# Patient Record
Sex: MALE | Race: WHITE
[De-identification: names, ages, dates, MRNs, and addresses within clinical notes are randomized per-mention and may not be internally consistent; named-entity substitution may affect disease eponyms.]

---

## 2017-01-04 LAB
BUN SERPL-MCNC: 14 MG/DL (ref 7–18)
BUN/CREAT SERPL: 15.1 (ref 10–20)
CREAT SERPL-MCNC: 0.96 MG/DL (ref 0.6–1.4)

## 2017-01-10 ENCOUNTER — HOSPITAL ENCOUNTER (OUTPATIENT)
Dept: HOSPITAL 45 - C.CTS | Age: 82
Discharge: HOME | End: 2017-01-10
Attending: NURSE PRACTITIONER
Payer: COMMERCIAL

## 2017-01-10 DIAGNOSIS — R31.9: Primary | ICD-10-CM

## 2017-01-10 NOTE — DIAGNOSTIC IMAGING REPORT
CT UROGRAM



CLINICAL HISTORY:   Hematuria.



COMPARISON STUDY:  Abdominal CT dated 9/28/2009.



TECHNIQUE: Before and following the IV administration of  119 cc of Optiray 320,

CT urogram of the abdomen and pelvis is performed from the lung bases to the

proximal femora. Images are reviewed in the axial, sagittal, and coronal planes.

IV contrast was administered without complication.



CT DOSE: 1654.26 mGycm



FINDINGS:



Lung bases: The heart is normal in size and without pericardial effusion. There

is linear scarring versus atelectasis present at both lung bases. Scattered

calcified granulomas are observed. No airspace consolidation or pleural effusion

is identified. There is a small to moderate hiatal hernia.



Liver: The contrast-enhanced liver is normal in size, contour, and attenuation.

There is no intrahepatic biliary ductal dilatation. The hepatic veins and portal

veins are patent. Scattered subcentimeter hepatic hypodensities likely represent

cysts but are too small for definitive characterization. These are similar to

the 2009 examination.



Gallbladder: Surgically absent noting clips in the gallbladder fossa.



Spleen: Normal in size and attenuation.



Pancreas: Unremarkable.



Adrenal glands: Unremarkable.



Kidneys and ureters: The contrast enhanced kidneys demonstrate mild cortical

atrophy and are without hydronephrosis. There are are no renal calculi

identified on the unenhanced images. The kidneys enhance and excrete

symmetrically. There are 2 cysts identified in the lower pole the right kidney.

These measure up to 2.1 cm. Both contain faint internal calcifications. These

have been present dating back to 2009. Additional subcentimeter cortical

hypodensities are seen in both kidneys. These also likely represent cysts but

are too small for definitive characterization. There is no enhancing renal

cortical mass lesion identified. There is no evidence of urothelial lesion

within the renal pelvis bilaterally or along the course of either ureter. There

is a circumaortic left renal vein.



Abdominal vasculature: The abdominal aorta is normal in course and caliber

noting moderate atherosclerotic calcification.



Bowel: The small bowel and colon are normal in course and caliber. There is

advanced sigmoid diverticulosis without CT evidence of acute diverticulitis.

Moderate colonic fecal retention is observed. The appendix is  well-visualized

and normal.



Peritoneum: There is no intraperitoneal free air or abdominal ascites.



Lymphadenopathy: None.



Pelvic viscera: The prostate gland is enlarged and heterogeneous, containing

coarse calcifications. The prostate gland measures up to 5.8 cm in transverse

diameter. There is median lobe hypertrophy. The bladder is normal as visualized.



Skeletal structures: The skeletal structures are osteopenic. There is mild

lumbosacral spondylosis, greatest at L5-S1. No lytic or blastic lesions are

seen.





IMPRESSION:



1. No renal calculi are identified.



2. There is no enhancing renal cortical mass. No evidence of urothelial lesion

is seen within the renal pelvis bilaterally or lung the course of the ureters.



3. The prostate gland is markedly enlarged and heterogeneous, and demonstrates

median lobe hypertrophy. Correlation with serum PSA levels is recommended.



4. The bladder is normal as visualized.



5. Advanced sigmoid diverticulosis without CT evidence of acute diverticulitis.



6. Additional changes as above.







Electronically signed by:  Jerman Jurado M.D.

1/10/2017 1:27 PM



Dictated Date/Time:  1/10/2017 1:19 PM

## 2017-05-30 ENCOUNTER — HOSPITAL ENCOUNTER (OUTPATIENT)
Dept: HOSPITAL 45 - C.LAB1850 | Age: 82
Discharge: HOME | End: 2017-05-30
Attending: INTERNAL MEDICINE
Payer: COMMERCIAL

## 2017-05-30 DIAGNOSIS — R73.03: ICD-10-CM

## 2017-05-30 DIAGNOSIS — Z13.1: ICD-10-CM

## 2017-05-30 DIAGNOSIS — R31.9: ICD-10-CM

## 2017-05-30 DIAGNOSIS — E55.9: Primary | ICD-10-CM

## 2017-05-30 LAB
ALBUMIN/GLOB SERPL: 1 {RATIO} (ref 0.9–2)
ALP SERPL-CCNC: 44 U/L (ref 45–117)
ALT SERPL-CCNC: 27 U/L (ref 12–78)
ANION GAP SERPL CALC-SCNC: 5 MMOL/L (ref 3–11)
AST SERPL-CCNC: 22 U/L (ref 15–37)
BASOPHILS # BLD: 0.04 K/UL (ref 0–0.2)
BASOPHILS NFR BLD: 0.7 %
BUN SERPL-MCNC: 22 MG/DL (ref 7–18)
BUN/CREAT SERPL: 22.3 (ref 10–20)
CALCIUM SERPL-MCNC: 8.8 MG/DL (ref 8.5–10.1)
CHLORIDE SERPL-SCNC: 105 MMOL/L (ref 98–107)
CHOLEST/HDLC SERPL: 2.7 {RATIO}
CO2 SERPL-SCNC: 31 MMOL/L (ref 21–32)
COMPLETE: YES
CREAT SERPL-MCNC: 1 MG/DL (ref 0.6–1.4)
EOSINOPHIL NFR BLD AUTO: 170 K/UL (ref 130–400)
EST. AVERAGE GLUCOSE BLD GHB EST-MCNC: 120 MG/DL
GLOBULIN SER-MCNC: 3.4 GM/DL (ref 2.5–4)
GLUCOSE SERPL-MCNC: 102 MG/DL (ref 70–99)
GLUCOSE UR QL: 53 MG/DL
HCT VFR BLD CALC: 43.8 % (ref 42–52)
IG%: 0.2 %
IMM GRANULOCYTES NFR BLD AUTO: 26.9 %
KETONES UR QL STRIP: 83 MG/DL
LYMPHOCYTES # BLD: 1.49 K/UL (ref 1.2–3.4)
MCH RBC QN AUTO: 29.2 PG (ref 25–34)
MCHC RBC AUTO-ENTMCNC: 33.1 G/DL (ref 32–36)
MCV RBC AUTO: 88.1 FL (ref 80–100)
MONOCYTES NFR BLD: 7.4 %
NEUTROPHILS # BLD AUTO: 11.4 %
NEUTROPHILS NFR BLD AUTO: 53.4 %
NITRITE UR QL STRIP: 47 MG/DL (ref 0–150)
PH UR: 145 MG/DL (ref 0–200)
PMV BLD AUTO: 9.9 FL (ref 7.4–10.4)
POTASSIUM SERPL-SCNC: 4.2 MMOL/L (ref 3.5–5.1)
RBC # BLD AUTO: 4.97 M/UL (ref 4.7–6.1)
SODIUM SERPL-SCNC: 141 MMOL/L (ref 136–145)
VERY LOW DENSITY LIPOPROT CALC: 9 MG/DL
WBC # BLD AUTO: 5.53 K/UL (ref 4.8–10.8)

## 2017-06-05 NOTE — CODING QUERY MEDICAL NECESSITY
SUPPORTING DIAGNOSIS NEEDED



Dr. Lee,



A supporting diagnosis is required for the test/procedure performed on this patient in 
order for us to be reimbursed by the patient's insurance. Please provide a supporting 
diagnosis for the following test/procedure listed below next to the test name along with 
your signature. 



*If there is no additional diagnosis for this patient that would support the following 
test/procedure please document that below next to the test/procedure.



Test(s)/Procedure(s) that require a supporting diagnosis:





* 13465 GLYCATED HEMOGLOBIN            DIAGNOSIS:





***DATE OF SERVICE: 5/30/17***





Provider Signature:  ______________________________  Date:  _______



Thank you  

Thomas Renee

[facility] Information Management

Phone:  [phone]

Fax:  [phone]



Once completed, please kindly fax back to [phone]



For questions please call [phone]

## 2017-06-13 ENCOUNTER — HOSPITAL ENCOUNTER (INPATIENT)
Dept: HOSPITAL 45 - C.EDB | Age: 82
LOS: 3 days | Discharge: HOME HEALTH | DRG: 378 | End: 2017-06-16
Attending: INTERNAL MEDICINE | Admitting: FAMILY MEDICINE
Payer: COMMERCIAL

## 2017-06-13 VITALS
HEIGHT: 69 IN | WEIGHT: 182.98 LBS | HEIGHT: 69 IN | WEIGHT: 182.98 LBS | BODY MASS INDEX: 27.1 KG/M2 | BODY MASS INDEX: 27.1 KG/M2

## 2017-06-13 VITALS
OXYGEN SATURATION: 97 % | DIASTOLIC BLOOD PRESSURE: 63 MMHG | HEART RATE: 55 BPM | SYSTOLIC BLOOD PRESSURE: 129 MMHG | TEMPERATURE: 98.24 F

## 2017-06-13 DIAGNOSIS — K57.90: ICD-10-CM

## 2017-06-13 DIAGNOSIS — Z79.82: ICD-10-CM

## 2017-06-13 DIAGNOSIS — K62.5: Primary | ICD-10-CM

## 2017-06-13 DIAGNOSIS — D69.6: ICD-10-CM

## 2017-06-13 DIAGNOSIS — E11.9: ICD-10-CM

## 2017-06-13 DIAGNOSIS — Z66: ICD-10-CM

## 2017-06-13 DIAGNOSIS — E55.9: ICD-10-CM

## 2017-06-13 DIAGNOSIS — J45.909: ICD-10-CM

## 2017-06-13 DIAGNOSIS — D62: ICD-10-CM

## 2017-06-13 DIAGNOSIS — Z86.73: ICD-10-CM

## 2017-06-13 DIAGNOSIS — F01.50: ICD-10-CM

## 2017-06-13 DIAGNOSIS — Z53.8: ICD-10-CM

## 2017-06-13 LAB
ALBUMIN/GLOB SERPL: 1.1 {RATIO} (ref 0.9–2)
ALP SERPL-CCNC: 44 U/L (ref 45–117)
ALT SERPL-CCNC: 29 U/L (ref 12–78)
ANION GAP SERPL CALC-SCNC: 8 MMOL/L (ref 3–11)
AST SERPL-CCNC: 29 U/L (ref 15–37)
BASOPHILS # BLD: 0.01 K/UL (ref 0–0.2)
BASOPHILS NFR BLD: 0.3 %
BUN SERPL-MCNC: 24 MG/DL (ref 7–18)
BUN/CREAT SERPL: 25.5 (ref 10–20)
CALCIUM SERPL-MCNC: 8.4 MG/DL (ref 8.5–10.1)
CHLORIDE SERPL-SCNC: 101 MMOL/L (ref 98–107)
CO2 SERPL-SCNC: 27 MMOL/L (ref 21–32)
COMPLETE: YES
CREAT SERPL-MCNC: 0.93 MG/DL (ref 0.6–1.4)
EOSINOPHIL NFR BLD AUTO: 105 K/UL (ref 130–400)
GLOBULIN SER-MCNC: 3.2 GM/DL (ref 2.5–4)
GLUCOSE SERPL-MCNC: 112 MG/DL (ref 70–99)
HCT VFR BLD CALC: 37.3 % (ref 42–52)
IG%: 0.3 %
IMM GRANULOCYTES NFR BLD AUTO: 17 %
INR PPP: 1.1 (ref 0.9–1.1)
LYMPHOCYTES # BLD: 0.62 K/UL (ref 1.2–3.4)
MCH RBC QN AUTO: 29.7 PG (ref 25–34)
MCHC RBC AUTO-ENTMCNC: 34.6 G/DL (ref 32–36)
MCV RBC AUTO: 85.9 FL (ref 80–100)
MONOCYTES NFR BLD: 5.5 %
NEUTROPHILS # BLD AUTO: 0.5 %
NEUTROPHILS NFR BLD AUTO: 76.4 %
PMV BLD AUTO: 8.7 FL (ref 7.4–10.4)
POTASSIUM SERPL-SCNC: 3.9 MMOL/L (ref 3.5–5.1)
PROTHROMBIN TIME: 11.3 SECONDS (ref 9–12)
RBC # BLD AUTO: 4.34 M/UL (ref 4.7–6.1)
SODIUM SERPL-SCNC: 136 MMOL/L (ref 136–145)
WBC # BLD AUTO: 3.65 K/UL (ref 4.8–10.8)

## 2017-06-13 RX ADMIN — SODIUM CHLORIDE SCH MLS/HR: 900 INJECTION, SOLUTION INTRAVENOUS at 22:45

## 2017-06-13 NOTE — DIAGNOSTIC IMAGING REPORT
CT HEAD WITHOUT CONTRAST (CT)



CLINICAL HISTORY: Acute change in mental status.    



COMPARISON STUDY:  2/6/2016



TECHNIQUE:  Axial CT of the brain is performed from the vertex to the skull

base. IV contrast was not administered for this examination.



CT DOSE: 537.48 mGy.cm



FINDINGS:



No intra or extra-axial mass lesions are visualized. There is no CT evidence of

acute cortical infarction. There is no evidence of midline shift. There is no

acute  hemorrhage. No calvarial fractures are visualized. 

There are patchy white matter hypodensities likely on a small vessel basis.

There is no evidence of pathologic ventricular dilatation.

There is no evidence of acute sinusitis



IMPRESSION: No acute intracranial findings







Electronically signed by:  Mikhail Courtney M.D.

6/13/2017 6:40 PM



Dictated Date/Time:  6/13/2017 6:40 PM

## 2017-06-13 NOTE — HISTORY AND PHYSICAL
History & Physical


Date & Time of Service:


Jun 13, 2017 at 21:58


Chief Complaint:


Weak - Not Able To Relate - Sent By Doctor


Primary Care Physician:


Td Lee M.D.


History of Present Illness


Source:  patient, family


83-year-old male with a past medical history of BPH, diabetic close presented 

to the ER with complaints of rectal bleeding and " not been coherent".


Per patient and his family he was seen in the ER last week for rectal bleeding 

and he continued to have rectal bleeding yesterday and this morning.  He 

noticed blood in the toilet bowl as well as on the tissue.  Denies any rectal 

pain or abdominal pain, nausea or vomiting, fevers or chills.


He complains of feeling very tired and fatigued.  He stated that he doesn't 

feel himself and had noticed that his gait had been more shuffling.  His wife 

also stated that he has trouble finding words and " has been slow in retrieving 

information" .  She thinks this is unusual for him.


Denies any numbness or tingling, motor weakness, blurry vision, slurring of 

speech.





Past Medical/Surgical History


Medical Problems:


(1) Diverticulosis


Status: Chronic  











Family History





Cancer


Heart disease





Social History


Smoking Status:  Never Smoker


Marital Status:  


Occupational Status:  employed





Immunizations


History of Influenza Vaccine:  No


Influenza Vaccine Date:  Sep 21, 2009


History of Tetanus Vaccine?:  Yes


History of Pneumococcal:  Yes


History of Hepatitis B Vaccine:  No





Multi-Drug Resistant Organisms


History of MDRO:  No





Allergies


Coded Allergies:  


     No Known Allergies (Verified , 6/13/17)





Home Medications


Scheduled


Ascorbic Acid (Vitamin C), 500 MG PO DAILY


Cholecalciferol (Vitamin D3), 1,000 UNITS PO DAILY


Donepezil HCl (Aricept), 1 TAB PO DAILY


Finasteride (Proscar), 5 MG PO DAILY


Fluticasone Prop/Salmeterol (Advair Diskus 500/50 60 Dose), 1 PUFF INH BID


Lansoprazole (Prevacid), 30 MG PO DAILY


Loratadine (Allergy Relief), 10 MG PO HS


Montelukast Sodium (Singulair), 10 MG PO DAILY


Multiple Vitamins W/ Minerals (Centrum Adults), 1 TAB PO DAILY


Ocuvite Preservision (Ocuvite Preservision), 2 TAB PO QAM


Ranitidine (Zantac), 300 MG PO HS


Tamsulosin Hcl (Flomax), 0.4 MG PO DAILY





Scheduled PRN


Albuterol Hfa (Ventolin Hfa), 2-4 PUFFS INH Q6H PRN for SOB/Wheezing





Review of Systems


Constitutional:  No fever, No chills


Eyes:  No worsening of vision


ENT:  + hearing loss


Respiratory:  No cough, No sputum, No shortness of breath


Cardiovascular:  No chest pain


Abdomen:  No pain, No nausea, No vomiting


Musculoskeletal:  No joint pain


Genitourinary - Male:  No hematuria, No dysuria


Neurologic:  + memory loss, + balance problems (shuffling gait), No paralysis, 

No numbness/tingling


Psychiatric:  No depression symptoms


Endocrine:  No fatigue


Hematologic / Lymphatic:  No abnormal bleeding/bruising





Physical Exam


Vital Signs











  Date Time  Temp Pulse Resp B/P (MAP) Pulse Ox O2 Delivery O2 Flow Rate FiO2


 


6/13/17 21:17  69 18 106/59 96 Room Air  


 


6/13/17 21:15  68      


 


6/13/17 18:39  71 18 127/65 95 Room Air  


 


6/13/17 18:00  75 16 121/66 94 Room Air  


 


6/13/17 17:10  79      


 


6/13/17 17:01     94 Room Air  


 


6/13/17 16:50 37.5 63 16 118/70 98 Room Air  








General Appearance:  WD/WN, no apparent distress


Head:  normocephalic


Eyes:  normal inspection


ENT:  normal ENT inspection, hearing grossly normal


Neck:  supple


Respiratory/Chest:  chest non-tender, lungs clear, normal breath sounds, no 

respiratory distress, no accessory muscle use


Cardiovascular:  regular rate, rhythm


Abdomen/GI:  normal bowel sounds, non tender, soft


Back:  normal inspection


Extremities/Musculoskelatal:  normal inspection, no pedal edema


Neurologic/Psych:  alert, normal mood/affect, oriented x 3


Skin:  normal color





Diagnostics


Laboratory Results





Results Past 24 Hours








Test


  6/13/17


17:50 Range/Units


 


 


White Blood Count 3.65 4.8-10.8  K/uL


 


Red Blood Count 4.34 4.7-6.1  M/uL


 


Hemoglobin 12.9 14.0-18.0  g/dL


 


Hematocrit 37.3 42-52  %


 


Mean Corpuscular Volume 85.9   fL


 


Mean Corpuscular Hemoglobin 29.7 25-34  pg


 


Mean Corpuscular Hemoglobin


Concent 34.6


  32-36  g/dl


 


 


Platelet Count 105 130-400  K/uL


 


Mean Platelet Volume 8.7 7.4-10.4  fL


 


Neutrophils (%) (Auto) 76.4  %


 


Lymphocytes (%) (Auto) 17.0  %


 


Monocytes (%) (Auto) 5.5  %


 


Eosinophils (%) (Auto) 0.5  %


 


Basophils (%) (Auto) 0.3  %


 


Neutrophils # (Auto) 2.79 1.4-6.5  K/uL


 


Lymphocytes # (Auto) 0.62 1.2-3.4  K/uL


 


Monocytes # (Auto) 0.20 0.11-0.59  K/uL


 


Eosinophils # (Auto) 0.02 0-0.5  K/uL


 


Basophils # (Auto) 0.01 0-0.2  K/uL


 


RDW Standard Deviation 42.4 36.4-46.3  fL


 


RDW Coefficient of Variation 13.5 11.5-14.5  %


 


Immature Granulocyte % (Auto) 0.3  %


 


Immature Granulocyte # (Auto) 0.01 0.00-0.02  K/uL


 


Prothrombin Time


  11.3


  9.0-12.0


SECONDS


 


Prothromb Time International


Ratio 1.1


  0.9-1.1  


 


 


Sodium Level 136 136-145  mmol/L


 


Potassium Level 3.9 3.5-5.1  mmol/L


 


Chloride Level 101   mmol/L


 


Carbon Dioxide Level 27 21-32  mmol/L


 


Anion Gap 8.0 3-11  mmol/L


 


Blood Urea Nitrogen 24 7-18  mg/dl


 


Creatinine


  0.93


  0.60-1.40


mg/dl


 


Estimated GFR (


American) 87.7


   


 


 


Estimated GFR (Non-


American 75.7


   


 


 


BUN/Creatinine Ratio 25.5 10-20  


 


Random Glucose 112 70-99  mg/dl


 


Lactic Acid Level 0.8 0.4-2.0  mmol/L


 


Calcium Level 8.4 8.5-10.1  mg/dl


 


Total Bilirubin 0.8 0.2-1  mg/dl


 


Aspartate Amino Transf


(AST/SGOT) 29


  15-37  U/L


 


 


Alanine Aminotransferase


(ALT/SGPT) 29


  12-78  U/L


 


 


Alkaline Phosphatase 44   U/L


 


Total Protein 6.6 6.4-8.2  gm/dl


 


Albumin 3.4 3.4-5.0  gm/dl


 


Globulin 3.2 2.5-4.0  gm/dl


 


Albumin/Globulin Ratio 1.1 0.9-2  


 


Lipase 188   U/L











Diagnostic Radiology


CT HEAD WITHOUT CONTRAST (CT)





CLINICAL HISTORY: Acute change in mental status.    





COMPARISON STUDY:  2/6/2016





TECHNIQUE:  Axial CT of the brain is performed from the vertex to the skull


base. IV contrast was not administered for this examination.





CT DOSE: 537.48 mGy.cm





FINDINGS:





No intra or extra-axial mass lesions are visualized. There is no CT evidence of


acute cortical infarction. There is no evidence of midline shift. There is no


acute  hemorrhage. No calvarial fractures are visualized. 


There are patchy white matter hypodensities likely on a small vessel basis.


There is no evidence of pathologic ventricular dilatation.


There is no evidence of acute sinusitis





IMPRESSION: No acute intracranial findings











Electronically signed by:  Mikhail Courtney M.D.


6/13/2017 6:40 PM





Dictated Date/Time:  6/13/2017 6:40 PM





EKG


Sinus rhythm with 1st degree A-V block


Otherwise normal ECG


When compared with ECG of 30-JUL-2010 10:07,


MI interval has increased


Vent. rate has increased BY 26 BPM


Confirmed by JESSICA WONG ([id number]) on 6/13/2017 7:01:01 PM





Impression


Assessment and Plan


83-year-old male with a past medical history of BPH, diabetic close presented 

to the ER with complaints of rectal bleeding and " not been coherent".





TIA vs Parkinson's disease vs dementia


Head CT: Negative for any acute bleeding


- MRI brain, MRA head, MRA neck ordered


- Echo ordered


- Consult neurology


- PT/OT


- Speech consult


- Aspirin had not been started considering active rectal bleeding





Rectal bleeding:


 Has been going on for about a week


- Hemoglobin at 12.9


- Monitor her H&H


- Consult GI





Thrombocytopenia:


- Platelets at 105


- baseline at 150s





h/o Asthma:


- continue inhalers





BPH:


- continue home Meds





DVT prophylaxis:


- SCDs





DNR





Disposition : admitted to [facility]





Level of Care


Telemetry





Resuscitation Status


DO NOT RESUSCITATE





VTE Prophylaxis


VTE Risk Assessment Done? Y/N:  Yes


Risk Level:  Moderate


Given or contraindicated:  Unfractionated heparin SQ





Resident Tracking


Resident Involvement:  Resident Care Provided


Care Provided:  Adult [facility] Medicine





Assessment and Plan


Attending Addendum:











I have physically seen and examined this patient, have directed their medical 

care, have supervised the medical residents activities, and agree with the H&P 

as noted above, with the following changes: NONE

## 2017-06-13 NOTE — EMERGENCY ROOM VISIT NOTE
History


Report prepared by Elissa:  Misbah Mark


Under the Supervision of:  Dr. Norma Garrett D.O.


First contact with patient:  17:10


Chief Complaint:  WEAKNESS


Stated Complaint:  WEAK - NOT ABLE TO RELATE - SENT BY DOCTOR


Nursing Triage Summary:  


Pt with wife. Pt was here on Thursday for rectal bleeding, was not anemic told 


to stop aspirin and sent home. Pt reports bleeding has persisted. Pt was seen 

by 


Dr today and physician felt pt was unable to follow conversation and relate his 


current condition. Sent to ER. Pt is able to answer all orientation questions 


appropriately at this time.





History of Present Illness


The patient is a 83 year old male who presents to the Emergency Room with 

complaints of persistent rectal bleeding beginning last week. He was seen by 

his PCP today for his symptoms, and was referred to the ED after also being 

noted to have an altered mental status. Per wife, the patient "has not been 

coherent", and has been angering very easily. She states that he has been 

struggling to find the words that he is trying to say. She states that his 

symptoms were present yesterday, but much worse today. The patient states "I don

't feel my normal self", and has felt extremely tired. He was seen in the ED 

last week for rectal bleeding, was found to be anemic and was discharged home. 

The patient states that his rectal bleeding has been "bright red", with "pieces 

of tissue". He states that the bleeding covered the entire inside of the toilet 

bowel. He states that he had an episode with large amount of rectal bleeding 

yesterday with a large amount of "tissue". The patient states that he noticed 

that his urine appeared "pink" today. He states that the stool in his bowel 

movement yesterday appeared dark in color. The patient's wife notes that the 

patient had a poly by colonoscopy. He states that the patient has appeared very 

pale recently. The patient denies any abdominal pain, fevers, headache, chest 

pain, or chills. He states that he has felt "faint" intermittently for the past 

few days. He notes that he had similar symptom problems with bleeding occur 

several months ago with urination.





   Source of History:  patient, spouse/significant other (wife)


   Onset:  Last week


   Position:  other (rectum)


   Quality:  other (bleeding)


   Timing:  other (persistent)


   Associated Symptoms:  + fatigue, No fevers, No chills, No headache, No chest 

pain, No abdominal pain


Note:


The patient also complains of an altered mental status.





Review of Systems


See HPI for pertinent positives & negatives. A total of 10 systems reviewed and 

were otherwise negative.





Past Medical & Surgical


Medical Problems:


(1) Benign prostate hyperplasia


(2) Diverticulosis


(3) TIA (transient ischemic attack)


Surgical Problems:


(1) Hx of cholecystectomy








Family History





Cancer


Heart disease





Social History


Smoking Status:  Never Smoker


Marital Status:  


Housing Status:  lives with family


Occupation Status:  employed





Current/Historical Medications


Scheduled


Ascorbic Acid (Vitamin C), 500 MG PO DAILY


Cholecalciferol (Vitamin D3), 1,000 UNITS PO DAILY


Finasteride (Proscar), 5 MG PO DAILY


Fluticasone Prop/Salmeterol (Advair Diskus 500/50 60 Dose), 1 PUFF INH BID


Lansoprazole (Prevacid), 30 MG PO DAILY


Loratadine (Allergy Relief), 10 MG PO HS


Montelukast Sodium (Singulair), 10 MG PO DAILY


Multiple Vitamins W/ Minerals (Centrum Adults), 1 TAB PO DAILY


Ocuvite Preservision (Ocuvite Preservision), 2 TAB PO QAM


Ranitidine (Zantac), 300 MG PO HS


Tamsulosin Hcl (Flomax), 0.4 MG PO DAILY





Scheduled PRN


Albuterol Hfa (Ventolin Hfa), 2-4 PUFFS INH Q6H PRN for SOB/Wheezing





Allergies


Coded Allergies:  


     No Known Allergies (Verified , 6/13/17)





Physical Exam


Vital Signs











  Date Time  Temp Pulse Resp B/P (MAP) Pulse Ox O2 Delivery O2 Flow Rate FiO2


 


6/13/17 18:39  71 18 127/65 95 Room Air  


 


6/13/17 18:00  75 16 121/66 94 Room Air  


 


6/13/17 17:10  79      


 


6/13/17 17:01     94 Room Air  


 


6/13/17 16:50 37.5 63 16 118/70 98 Room Air  











Physical Exam


GENERAL: alert, well appearing, well nourished, no distress, non-toxic 


EYE EXAM: normal conjunctiva, PERRL and EOM's grossly intact


OROPHARYNX: no exudate, no erythema, lips, buccal mucosa, and tongue normal and 

mucous membranes are moist


NECK: supple, no nuchal rigidity, no adenopathy, non-tender


LUNGS: Clear to auscultation. Normal chest wall mechanics


HEART: no murmurs, S1 normal and S2 normal 


ABDOMEN: abdomen soft, non-tender, normo-active bowel sounds, no masses, no 

rebound or guarding. 


BACK: Back is symmetrical on inspection and there is no deformity, no midline 

tenderness, no CVA tenderness. 


SKIN: no rashes and no bruising 


UPPER EXTREMITIES: upper extremities are grossly normal. 


LOWER EXTREMITIES: No pitting edema.


NEURO EXAM: Normal sensorium, cranial nerves II-XII grossly intact, normal 

speech,  no gross weakness of arms, no gross weakness of legs. Poor memory. 

Difficulty finding words. Difficulty answering questions.





Medical Decision & Procedures


ER Provider


Diagnostic Interpretation:


CT:Per my review, radiologist interpretation.





CT HEAD WITHOUT CONTRAST (CT)





FINDINGS:





No intra or extra-axial mass lesions are visualized. There is no CT evidence of


acute cortical infarction. There is no evidence of midline shift. There is no


acute  hemorrhage. No calvarial fractures are visualized. 


There are patchy white matter hypodensities likely on a small vessel basis.


There is no evidence of pathologic ventricular dilatation.


There is no evidence of acute sinusitis





IMPRESSION: No acute intracranial findings





Electronically signed by:  Mikhail Courtney M.D.





Laboratory Results











Test


  6/13/17


17:50


 


Peripheral Blood Smear Path


Consult  


 


 


Prothrombin Time


  11.3 SECONDS


(9.0-12.0)


 


Prothromb Time International


Ratio 1.1 (0.9-1.1) 


 


 


Estimated Average Glucose 117 mg/dl 


 


Hemoglobin A1c


  5.7 %


(4.5-5.6)


 


Lactic Acid Level


  0.8 mmol/L


(0.4-2.0)


 


Total Bilirubin


  0.8 mg/dl


(0.2-1)


 


Aspartate Amino Transf


(AST/SGOT) 29 U/L (15-37) 


 


 


Alanine Aminotransferase


(ALT/SGPT) 29 U/L (12-78) 


 


 


Alkaline Phosphatase


  44 U/L


()


 


Total Protein


  6.6 gm/dl


(6.4-8.2)


 


Albumin


  3.4 gm/dl


(3.4-5.0)


 


Globulin


  3.2 gm/dl


(2.5-4.0)


 


Albumin/Globulin Ratio 1.1 (0.9-2) 


 


Lipase


  188 U/L


()





Laboratory results per my review.





ECG


Indication:  altered mental status


Rate (beats per minute):  73


Rhythm:  normal sinus


Findings:  1st degree AV block, no acute ischemic change, other (Normal axis. 

Normal intervals. )





ED Course


1712: The patient was evaluated in room C1B. A complete history and physical 

exam was performed. 





1926: I reassessed the patient. He has normal strength in all four extremities. 

He has no ataxia. No pronator drift. Difficulty on the right with finger to 

nose noted. 





2000: Upon reevaluation, the patient is resting comfortably. I discussed the 

findings and the treatment plan with the patient.  He expresses agreement and 

understanding.  I spoke with Dr. Lopez of the [facility]. The 

patient will be evaluated for further management.





Medical Decision


Differential diagnosis:


Etiologies such as metabolic, infection, hypoglycemia, electrolyte abnormalities

, cardiac sources, intracerebral event, toxicologic, neurologic, as well as 

others were entertained.





Pt well appearing with stable VS and no acute change in H/H despite recent Gi 

bleed.  Wife and PCP concerned about slightly altered mental status and 

difficulty with word findings.  No focal neuro deficits, mild difficulty with 

right finger to nose testing.  No dysarthria.  NIHSS <4 and sx >24 hrs, pt not 

a tPA candidate.  Concern for possible cva/tia given that pt was advised to DC 

ASA in light of new bleeding.  Pt elderly, hx of elevated lipids also - risk 

factors for CVA.  No acute change on CT head.





Consults


Time Called:  1930


Consulting Physician:  Dr. Lopez -[facility]


Returned Call:  [id number]


I reviewed the patient's case with Dr. Lopez.  [facility] will evaluate the patient 

for further management.





Impression





 Primary Impression:  


 Altered mental status


 Additional Impressions:  


 Anemia


 Rectal bleeding





Scribe Attestation


The scribe's documentation has been prepared under my direction and personally 

reviewed by me in its entirety. I confirm that the note above accurately 

reflects all work, treatment, procedures, and medical decision making performed 

by me.





Departure Information


Dispostion


Being Evaluated By Hospitalist





Referrals


Td Lee M.D. (PCP)





Patient Instructions


My [facility]





Problem Qualifiers








 Primary Impression:  


 Altered mental status


 Altered mental status type:  unspecified  Qualified Codes:  R41.82 - Altered 

mental status, unspecified


 Additional Impressions:  


 Anemia


 Anemia type:  unspecified type  Qualified Codes:  D64.9 - Anemia, unspecified

## 2017-06-14 VITALS
TEMPERATURE: 98.06 F | DIASTOLIC BLOOD PRESSURE: 71 MMHG | SYSTOLIC BLOOD PRESSURE: 127 MMHG | HEART RATE: 71 BPM | OXYGEN SATURATION: 95 %

## 2017-06-14 VITALS
HEART RATE: 57 BPM | SYSTOLIC BLOOD PRESSURE: 108 MMHG | TEMPERATURE: 97.34 F | OXYGEN SATURATION: 95 % | DIASTOLIC BLOOD PRESSURE: 55 MMHG

## 2017-06-14 VITALS — OXYGEN SATURATION: 95 %

## 2017-06-14 VITALS
TEMPERATURE: 98.96 F | OXYGEN SATURATION: 95 % | SYSTOLIC BLOOD PRESSURE: 104 MMHG | DIASTOLIC BLOOD PRESSURE: 50 MMHG | HEART RATE: 76 BPM

## 2017-06-14 VITALS
DIASTOLIC BLOOD PRESSURE: 66 MMHG | OXYGEN SATURATION: 96 % | TEMPERATURE: 99.5 F | SYSTOLIC BLOOD PRESSURE: 88 MMHG | HEART RATE: 70 BPM

## 2017-06-14 VITALS
DIASTOLIC BLOOD PRESSURE: 58 MMHG | OXYGEN SATURATION: 96 % | SYSTOLIC BLOOD PRESSURE: 103 MMHG | HEART RATE: 62 BPM | TEMPERATURE: 99.32 F

## 2017-06-14 VITALS — OXYGEN SATURATION: 96 %

## 2017-06-14 VITALS
HEART RATE: 74 BPM | SYSTOLIC BLOOD PRESSURE: 118 MMHG | DIASTOLIC BLOOD PRESSURE: 57 MMHG | OXYGEN SATURATION: 94 % | TEMPERATURE: 101.12 F

## 2017-06-14 LAB
ANION GAP SERPL CALC-SCNC: 6 MMOL/L (ref 3–11)
APPEARANCE UR: CLEAR
BASOPHILS # BLD: 0.01 K/UL (ref 0–0.2)
BASOPHILS NFR BLD: 0.3 %
BILIRUB UR-MCNC: (no result) MG/DL
BUN SERPL-MCNC: 24 MG/DL (ref 7–18)
BUN/CREAT SERPL: 27.9 (ref 10–20)
CALCIUM SERPL-MCNC: 7.6 MG/DL (ref 8.5–10.1)
CHLORIDE SERPL-SCNC: 101 MMOL/L (ref 98–107)
CHOLEST/HDLC SERPL: 2.5 {RATIO}
CO2 SERPL-SCNC: 28 MMOL/L (ref 21–32)
COLOR UR: (no result)
COMPLETE: YES
CREAT CL PREDICTED SERPL C-G-VRATE: 64.4 ML/MIN
CREAT SERPL-MCNC: 0.87 MG/DL (ref 0.6–1.4)
EOSINOPHIL NFR BLD AUTO: 101 K/UL (ref 130–400)
EST. AVERAGE GLUCOSE BLD GHB EST-MCNC: 117 MG/DL
GLUCOSE SERPL-MCNC: 101 MG/DL (ref 70–99)
GLUCOSE UR QL: 46 MG/DL
HCT VFR BLD CALC: 36.3 % (ref 42–52)
IG%: 0.3 %
IMM GRANULOCYTES NFR BLD AUTO: 15.7 %
KETONES UR QL STRIP: 55 MG/DL
LYMPHOCYTES # BLD: 0.59 K/UL (ref 1.2–3.4)
MANUAL MICROSCOPIC REQUIRED?: NO
MCH RBC QN AUTO: 29.6 PG (ref 25–34)
MCHC RBC AUTO-ENTMCNC: 34.2 G/DL (ref 32–36)
MCV RBC AUTO: 86.6 FL (ref 80–100)
MONOCYTES NFR BLD: 9.6 %
NEUTROPHILS # BLD AUTO: 0.5 %
NEUTROPHILS NFR BLD AUTO: 73.6 %
NITRITE UR QL STRIP: (no result)
NITRITE UR QL STRIP: 58 MG/DL (ref 0–150)
PH UR STRIP: 5.5 [PH] (ref 4.5–7.5)
PH UR: 113 MG/DL (ref 0–200)
PMV BLD AUTO: 9.7 FL (ref 7.4–10.4)
POTASSIUM SERPL-SCNC: 3.7 MMOL/L (ref 3.5–5.1)
RBC # BLD AUTO: 4.19 M/UL (ref 4.7–6.1)
REVIEW REQ?: NO
SODIUM SERPL-SCNC: 135 MMOL/L (ref 136–145)
SP GR UR STRIP: 1.03 (ref 1–1.03)
URINE BILL WITH OR WITHOUT MIC: (no result)
UROBILINOGEN UR-MCNC: (no result) MG/DL
VERY LOW DENSITY LIPOPROT CALC: 12 MG/DL
WBC # BLD AUTO: 3.75 K/UL (ref 4.8–10.8)

## 2017-06-14 RX ADMIN — PANTOPRAZOLE SCH MG: 40 TABLET, DELAYED RELEASE ORAL at 08:56

## 2017-06-14 RX ADMIN — FLUTICASONE PROPIONATE AND SALMETEROL SCH PUFF: 50; 500 POWDER RESPIRATORY (INHALATION) at 21:14

## 2017-06-14 RX ADMIN — FINASTERIDE SCH MG: 5 TABLET, FILM COATED ORAL at 08:55

## 2017-06-14 RX ADMIN — MONTELUKAST SODIUM SCH MG: 10 TABLET, FILM COATED ORAL at 08:55

## 2017-06-14 RX ADMIN — Medication SCH MG: at 08:56

## 2017-06-14 RX ADMIN — SODIUM CHLORIDE SCH MLS/HR: 900 INJECTION, SOLUTION INTRAVENOUS at 08:54

## 2017-06-14 RX ADMIN — FLUTICASONE PROPIONATE AND SALMETEROL SCH PUFF: 50; 500 POWDER RESPIRATORY (INHALATION) at 08:54

## 2017-06-14 RX ADMIN — ATORVASTATIN CALCIUM SCH MG: 40 TABLET, FILM COATED ORAL at 08:55

## 2017-06-14 RX ADMIN — SODIUM CHLORIDE SCH MLS/HR: 900 INJECTION, SOLUTION INTRAVENOUS at 18:22

## 2017-06-14 RX ADMIN — POLYETHYLENE GLYCOL 3350, SODIUM SULFATE ANHYDROUS, SODIUM BICARBONATE, SODIUM CHLORIDE, POTASSIUM CHLORIDE SCH DOSE: 236; 22.74; 6.74; 5.86; 2.97 POWDER, FOR SOLUTION ORAL at 18:21

## 2017-06-14 RX ADMIN — TAMSULOSIN HYDROCHLORIDE SCH MG: 0.4 CAPSULE ORAL at 08:56

## 2017-06-14 RX ADMIN — RANITIDINE SCH MG: 150 TABLET ORAL at 08:55

## 2017-06-14 NOTE — CLINICAL DOCUMENTATION QUERY
**** CLINICAL DOCUMENTATION QUERY****



Dr. TIPTON, 



In your clinical opinion is this patient being managed for:

    

                        (X  ) Acute blood loss anemia

                        (  ) Other explanation of clinical findings (Please Explain)

                        (  ) Unable to determine (Please Define)

                        (  ) Need to Discuss

                        (  ) Not Agree



The medical record reflects the following clinical findings, treatment, and risk factors.  
  



Clinical Indicators: 84 yo male presenting with persistent rectal bleeding. Initial Hgb 
12.9, Hct 37.3 compared to 1 week earlier results of 14.4/42.4.

Treatment: GI consult with plan for colonoscopy, hold ASA, serial CBC's, IV fluids

Risk Factors:age, ongoing rectal bleeding



Please clarify and document your clinical opinion in the progress notes and discharge 
summary. Terms such as "probable", "suspected", "likely", "questionable", "possible", or 
"still to be ruled out" are acceptable. 



*****IF IN AGREEMENT, YOU MUST DOCUMENT ABOVE DIAGNOSTIC STATEMENT IN DAILY PROGRESS NOTES 
AND DISCHARGE SUMMARY. This document is not part of the patient's record.*****

Thank You, Enedina De Luna, [phone]

## 2017-06-14 NOTE — DIAGNOSTIC IMAGING REPORT
MRA OF THE INTRACRANIAL CIRCULATION WITHOUT CONTRAST



CLINICAL HISTORY: Weakness. Stroke.    



COMPARISON STUDY: MRI of the brain February 8, 2016.



TECHNIQUE: Utilizing a 1.5 Ct magnet and 3-D time-of-flight technique,

unenhanced MRA of the intracranial circulation was obtained. 



FINDINGS: The left A1 segment is hypoplastic. There is no abrupt vessel cut off

within the intracranial circulation. The posterior circulation is intact. No

aneurysm or dissection is identified within the intracranial vessels. There is

mild intracranial vascular irregularity.



IMPRESSION:  Unremarkable MRA of the intracranial circulation.







Electronically signed by:  Fabrizio Ruth M.D.

6/14/2017 7:27 AM



Dictated Date/Time:  6/14/2017 7:24 AM

## 2017-06-14 NOTE — PROGRESS NOTE
Subjective


Date of Service:


Jun 14, 2017.


Subjective


Pt evaluation today including:  conversation w/ patient, conversation w/ family 

(wife), physical exam, chart review, lab review, review of studies, review of 

inpatient medication list


Pt sitting up in chair


Denies any distress


Wife states transient confusion at time still but improved 


No headaches


No fevers or chills or worsening rectal bleeding


No acute events overnight





Problem List


Medical Problems:


(1) Altered mental status


Status: Acute  





(2) Altered mental status


Status: Acute  





(3) Fever


Status: Acute  





(4) Lower GI bleeding


Status: Acute  











Review of Systems


Constitutional:  No fever, No chills, No weight loss, No weakness


ENT:  No hearing loss, No unusual epistaxis, No nasal symptoms, No sore throat


Respiratory:  No cough, No sputum, No wheezing, No shortness of breath, No 

dyspnea on exertion


Cardiac:  No chest pain, No orthopnea, No PND, No edema


Abdomen:  No pain, No nausea, No vomiting, No constipation


Musculoskeletal:  No joint pain, No muscle pain


Male :  No dysuria, No urinary frequency, No incontinence, No hematuria


Neurologic:  + memory loss, No paralysis, No weakness, No balance problems


Psychiatric:  No depression symptoms, No anhedonism, No anxiety, No insomnia


Endo:  No fatigue, No excessive thirst


Skin:  No rash, No itch





Objective


Vital Signs











  Date Time  Temp Pulse Resp B/P (MAP) Pulse Ox O2 Delivery O2 Flow Rate FiO2


 


6/14/17 08:00     96 Room Air  


 


6/14/17 07:55 37.5 70 16 88/42 (57) 96 Room Air  





    121/66 (84)    


 


6/14/17 04:00      Room Air  


 


6/14/17 02:54 38.4 74 16 118/57 (77) 94 Room Air  


 


6/13/17 23:59      Room Air  


 


6/13/17 23:00 36.8 55 18 129/63 97 Room Air  


 


6/13/17 21:17  69 18 106/59 96 Room Air  


 


6/13/17 21:15  68      


 


6/13/17 18:39  71 18 127/65 95 Room Air  


 


6/13/17 18:00  75 16 121/66 94 Room Air  


 


6/13/17 17:10  79      


 


6/13/17 17:01     94 Room Air  


 


6/13/17 16:50 37.5 63 16 118/70 98 Room Air  











Physical Exam


General Appearance:  WD/WN, no apparent distress


Eyes:  normal inspection, PERRL, EOMI, sclerae normal


Neck:  supple, no adenopathy, thyroid normal, no JVD


Respiratory/Chest:  chest non-tender, lungs clear, normal breath sounds, no 

respiratory distress


Cardiovascular:  regular rate, rhythm, no edema, no gallop, no JVD


Abdomen:  normal bowel sounds, non tender, soft, no organomegaly


Extremities:  normal range of motion, non-tender, normal inspection, no pedal 

edema


Neurologic/Psychiatric:  no motor/sensory deficits, alert, normal mood/affect, 

oriented x 3





Laboratory Results





Last 24 Hours








Test


  6/13/17


17:50 6/14/17


02:50 6/14/17


11:09


 


White Blood Count 3.65 K/uL  3.75 K/uL  


 


Red Blood Count 4.34 M/uL  4.19 M/uL  


 


Hemoglobin 12.9 g/dL  12.4 g/dL  


 


Hematocrit 37.3 %  36.3 %  


 


Mean Corpuscular Volume 85.9 fL  86.6 fL  


 


Mean Corpuscular Hemoglobin 29.7 pg  29.6 pg  


 


Mean Corpuscular Hemoglobin


Concent 34.6 g/dl 


  34.2 g/dl 


  


 


 


Platelet Count 105 K/uL  101 K/uL  


 


Mean Platelet Volume 8.7 fL  9.7 fL  


 


Neutrophils (%) (Auto) 76.4 %  73.6 %  


 


Lymphocytes (%) (Auto) 17.0 %  15.7 %  


 


Monocytes (%) (Auto) 5.5 %  9.6 %  


 


Eosinophils (%) (Auto) 0.5 %  0.5 %  


 


Basophils (%) (Auto) 0.3 %  0.3 %  


 


Neutrophils # (Auto) 2.79 K/uL  2.76 K/uL  


 


Lymphocytes # (Auto) 0.62 K/uL  0.59 K/uL  


 


Monocytes # (Auto) 0.20 K/uL  0.36 K/uL  


 


Eosinophils # (Auto) 0.02 K/uL  0.02 K/uL  


 


Basophils # (Auto) 0.01 K/uL  0.01 K/uL  


 


RDW Standard Deviation 42.4 fL  43.2 fL  


 


RDW Coefficient of Variation 13.5 %  13.6 %  


 


Immature Granulocyte % (Auto) 0.3 %  0.3 %  


 


Immature Granulocyte # (Auto) 0.01 K/uL  0.01 K/uL  


 


Prothrombin Time 11.3 SECONDS   


 


Prothromb Time International


Ratio 1.1 


  


  


 


 


Sodium Level 136 mmol/L  135 mmol/L  


 


Potassium Level 3.9 mmol/L  3.7 mmol/L  


 


Chloride Level 101 mmol/L  101 mmol/L  


 


Carbon Dioxide Level 27 mmol/L  28 mmol/L  


 


Anion Gap 8.0 mmol/L  6.0 mmol/L  


 


Blood Urea Nitrogen 24 mg/dl  24 mg/dl  


 


Creatinine 0.93 mg/dl  0.87 mg/dl  


 


Estimated GFR (


American) 87.7 


  92.5 


  


 


 


Estimated GFR (Non-


American 75.7 


  79.8 


  


 


 


BUN/Creatinine Ratio 25.5  27.9  


 


Random Glucose 112 mg/dl  101 mg/dl  


 


Estimated Average Glucose 117 mg/dl   


 


Hemoglobin A1c 5.7 %   


 


Lactic Acid Level 0.8 mmol/L   


 


Calcium Level 8.4 mg/dl  7.6 mg/dl  


 


Total Bilirubin 0.8 mg/dl   


 


Aspartate Amino Transf


(AST/SGOT) 29 U/L 


  


  


 


 


Alanine Aminotransferase


(ALT/SGPT) 29 U/L 


  


  


 


 


Alkaline Phosphatase 44 U/L   


 


Total Protein 6.6 gm/dl   


 


Albumin 3.4 gm/dl   


 


Globulin 3.2 gm/dl   


 


Albumin/Globulin Ratio 1.1   


 


Lipase 188 U/L   


 


Est Creatinine Clear Calc


Drug Dose 


  64.4 ml/min 


  


 


 


Troponin I  < 0.015 ng/ml  


 


Triglycerides Level  58 mg/dl  


 


Cholesterol Level  113 mg/dl  


 


HDL Cholesterol  46 mg/dl  


 


LDL Cholesterol, Calculated  55 mg/dl  


 


VLDL Cholesterol, Calculated  12 mg/dl  


 


Cholesterol/HDL Ratio  2.5  


 


Thyroid Stimulating Hormone


(TSH) 


  1.030 uIu/ml 


  


 











Assessment and Plan


82 yo male presents to the ER with complaints of rectal bleeding and increasing 

forgetfulness for past 10 days





Confusion likely related to mild dementia. No source of infections and sign or 

symptoms of parkinsons dementia. 


CT head/MRI/MRA head and neck unremarkable. Gait intact and no weakness noted. 

Likely anemia from rectal 


bleeding exacerbating dementia. Neurology consulted and recs appreciated. Will 

also start on 5 mg PO qhs





Acute blood loss anemia likely related to rectal bleeding, GI consulted, Hg 

stable at this time. Will undergo golytely 


and plan for colonoscopy in AM. Cont to hold ASA





Thrombocytopenia 101, chronic, baseline is around 150





H/o Asthma, continue advair, singulair and albuterol, stable at this time





BPH, cont flomax and proscar





DVT prophylaxis with SCDs only due to rectal bleeding and anemia





DNR code status

## 2017-06-14 NOTE — NEUROLOGY CONSULTATION
Neurology Consultation


Date of Consultation:


Jun 14, 2017.


Attending Physician:


Rod Thomas D.O.


Primary Care Physician:


Td Lee M.D.


Reason for Consultation:


Possible TIA versus Parkinson's disease versus dementia


History of Present Illness


Source:  patient, hospital records


The patient is an 83-year-old male with a chief complaint of confusion 

occurring in the context of anemia and rectal bleeding. This patient's altered 

mental status began perhaps 2 days ago and has been characterized by difficulty 

with word finding, apparent confusion, and some mood irritability described as 

tendency to anger. The patient does admit that he has been having some 

difficulty with short-term memory for quite some time. He also complains of 

some problems with gait which he describes as shuffling. The patient is a 

retired . He appears to be an appropriate historian but 

does tend to ramble a bit. He described the origin of the specific names for 

each month of the year during our casual conversation. Overall his thoughts 

were coherent and goal-directed. His attention span seemed to be normal. He was 

very pleasant and smiled appropriately. He denies tremor or motor restlessness. 

He denies hallucinations.





Past Medical/Surgical History


Medical Problems:


(1) Altered mental status


Status: Acute  





(2) Altered mental status


Status: Acute  





(3) Fever


Status: Acute  





(4) Lower GI bleeding


Status: Acute  











Family History


Noncontributory given patient's advanced age





Social History


Marital Status:  


Housing Status:  lives with family


Occupation Status:  employed





Allergies


Coded Allergies:  


     No Known Allergies (Verified , 6/13/17)





Current Inpatient Medications





Current Inpatient Medications








 Medications


  (Trade)  Dose


 Ordered  Sig/Bobbi


 Route  Start Time


 Stop Time Status Last Admin


Dose Admin


 


 Atorvastatin


 Calcium


  (Lipitor Tab)  40 mg  QAM


 PO  6/14/17 09:00


 7/14/17 08:59  6/14/17 08:55


40 MG


 


 Miscellaneous


 Information


  (Pharmacist


 Discharge Med Rec


 Consult)  1 ea  UD  PRN


 N/A  6/13/17 21:15


 7/13/17 21:14   


 


 


 Sodium Chloride  1,000 ml @ 


 100 mls/hr  Q10H


 IV  6/13/17 22:45


 7/13/17 22:44  6/14/17 08:54


100 MLS/HR


 


 Acetaminophen


  (Tylenol Tab)  650 mg  Q4H  PRN


 PO  6/13/17 21:15


 7/13/17 21:14   


 


 


 Ondansetron HCl


  (Zofran Inj)  4 mg  Q6H  PRN


 IV  6/13/17 21:15


 7/13/17 21:14   


 


 


 Polyethylene


  (Miralax Powder


 Packet)  17 gm  DAILY  PRN


 PO  6/13/17 21:15


 7/13/17 21:14   


 


 


 Albuterol


  (Ventolin Hfa


 Inhaler)  2 puffs  Q6H  PRN


 INH  6/13/17 23:00


 7/13/17 22:59   


 


 


 Finasteride


  (Proscar Tab)  5 mg  DAILY


 PO  6/14/17 09:00


 7/14/17 08:59  6/14/17 08:55


5 MG


 


 Salmeterol


 Xinafoate/


 Fluticasone


  (Advair Diskus


 500/50 Inh)  1 puff  BID


 INH  6/14/17 09:00


 7/14/17 08:59  6/14/17 08:54


1 PUFF


 


 Montelukast Sodium


  (Singulair Tab)  10 mg  DAILY


 PO  6/14/17 09:00


 7/14/17 08:59  6/14/17 08:55


10 MG


 


 Tamsulosin HCl


  (Flomax Cap)  0.4 mg  DAILY


 PO  6/14/17 09:00


 7/14/17 08:59  6/14/17 08:56


0.4 MG


 


 Pantoprazole


 Sodium


  (Protonix Tab)  40 mg  QAM


 PO  6/14/17 09:00


 7/14/17 08:59  6/14/17 08:56


40 MG


 


 Loratadine


  (Claritin Tab)  10 mg  HS


 PO  6/14/17 21:00


 7/14/17 20:59  6/14/17 08:56


10 MG


 


 Ranitidine HCl


  (zANTac TAB)  300 mg  HS


 PO  6/14/17 21:00


 7/14/17 20:59  6/14/17 08:55


300 MG


 


 Polyethylene


 Glycol/


 Electrolytes


  (Golytely Soln)  8 dose  TODAY@0300,1800


 PO  6/14/17 18:00


 6/15/17 03:01   


 











Review of Systems


A full 10 point review of systems was obtained from this patient with pertinent 

positives and negatives described in the history of present illness. Recent 

issue with maroon-colored blood per rectum described by patient.





All other systems reviewed and are negative.





Physical Exam


Vital Signs (Past 24 Hrs):











  Date Time  Temp Pulse Resp B/P (MAP) Pulse Ox O2 Delivery O2 Flow Rate FiO2


 


6/14/17 08:00     96 Room Air  


 


6/14/17 07:55 37.5 70 16 88/42 (57) 96 Room Air  





    121/66 (84)    


 


6/14/17 04:00      Room Air  


 


6/14/17 02:54 38.4 74 16 118/57 (77) 94 Room Air  


 


6/13/17 23:59      Room Air  


 


6/13/17 23:00 36.8 55 18 129/63 97 Room Air  


 


6/13/17 21:17  69 18 106/59 96 Room Air  


 


6/13/17 21:15  68      


 


6/13/17 18:39  71 18 127/65 95 Room Air  


 


6/13/17 18:00  75 16 121/66 94 Room Air  


 


6/13/17 17:10  79      


 


6/13/17 17:01     94 Room Air  


 


6/13/17 16:50 37.5 63 16 118/70 98 Room Air  








The patient is a well-developed, well-nourished, elderly male. He is pleasant 

and cooperative, no acute distress. Attention and concentration normal. Mild 

impairment of short-term memory noted, 1 out of 3 recall. Patient exhibits a 

normal spontaneous fluent speech pattern. He is able to name objects and repeat 

phrases. He reads text without difficulty. Vocabulary and fund of knowledge 

normal. Visual fields full to confrontation. Visual acuity normal. Pupils equal 

round reactive to light and accommodation. Eye movements normal. Facial 

sensation intact. Facial strength intact. Palate elevates to midline. Tongue 

protrudes to midline. Shoulder shrug intact. Hearing intact. There is 

diminished sensation to vibration at the ankles. Light touch, temperature, 

proprioception intact. There is no dysmetria with finger to nose or heel to 

shin bilaterally. Deep tendon reflexes are normoactive and symmetric with the 

arms and legs although diminished at the Achilles tendons bilaterally. Plantar 

responses downgoing bilaterally. Ophthalmoscopic examination reveals normal-

appearing optic nerves and posterior segments, no papilledema or hemorrhages. 

Carotid pulses normal to auscultation bilaterally, no bruits. There is normal 

strength and tone for all 4 limbs proximally and distally. No atrophy. No 

abnormal movements observed. Stance is somewhat wide-based. Gait otherwise 

appears normal.





Laboratory Results


Past 24 Hours:


6/14/17 02:50








Red Blood Count 4.19, Mean Corpuscular Volume 86.6, Mean Corpuscular Hemoglobin 

29.6, Mean Corpuscular Hemoglobin Concent 34.2, Mean Platelet Volume 9.7, 

Neutrophils (%) (Auto) 73.6, Lymphocytes (%) (Auto) 15.7, Monocytes (%) (Auto) 

9.6, Eosinophils (%) (Auto) 0.5, Basophils (%) (Auto) 0.3, Neutrophils # (Auto) 

2.76, Lymphocytes # (Auto) 0.59, Monocytes # (Auto) 0.36, Eosinophils # (Auto) 

0.02, Basophils # (Auto) 0.01





6/14/17 02:50

















Test


  6/13/17


17:50 6/14/17


02:50


 


Prothrombin Time


  11.3 SECONDS


(9.0-12.0) 


 


 


Prothromb Time International


Ratio 1.1 (0.9-1.1) 


  


 


 


Estimated Average Glucose 117 mg/dl  


 


Hemoglobin A1c


  5.7 %


(4.5-5.6) 


 


 


Lactic Acid Level


  0.8 mmol/L


(0.4-2.0) 


 


 


Total Bilirubin


  0.8 mg/dl


(0.2-1) 


 


 


Aspartate Amino Transf


(AST/SGOT) 29 U/L (15-37) 


  


 


 


Alanine Aminotransferase


(ALT/SGPT) 29 U/L (12-78) 


  


 


 


Alkaline Phosphatase


  44 U/L


() 


 


 


Total Protein


  6.6 gm/dl


(6.4-8.2) 


 


 


Albumin


  3.4 gm/dl


(3.4-5.0) 


 


 


Globulin


  3.2 gm/dl


(2.5-4.0) 


 


 


Albumin/Globulin Ratio 1.1 (0.9-2)  


 


Lipase


  188 U/L


() 


 


 


White Blood Count


  


  3.75 K/uL


(4.8-10.8)


 


Red Blood Count


  


  4.19 M/uL


(4.7-6.1)


 


Hemoglobin


  


  12.4 g/dL


(14.0-18.0)


 


Hematocrit  36.3 % (42-52) 


 


Mean Corpuscular Volume


  


  86.6 fL


()


 


Mean Corpuscular Hemoglobin


  


  29.6 pg


(25-34)


 


Mean Corpuscular Hemoglobin


Concent 


  34.2 g/dl


(32-36)


 


Platelet Count


  


  101 K/uL


(130-400)


 


Mean Platelet Volume


  


  9.7 fL


(7.4-10.4)


 


Neutrophils (%) (Auto)  73.6 % 


 


Lymphocytes (%) (Auto)  15.7 % 


 


Monocytes (%) (Auto)  9.6 % 


 


Eosinophils (%) (Auto)  0.5 % 


 


Basophils (%) (Auto)  0.3 % 


 


Neutrophils # (Auto)


  


  2.76 K/uL


(1.4-6.5)


 


Lymphocytes # (Auto)


  


  0.59 K/uL


(1.2-3.4)


 


Monocytes # (Auto)


  


  0.36 K/uL


(0.11-0.59)


 


Eosinophils # (Auto)


  


  0.02 K/uL


(0-0.5)


 


Basophils # (Auto)


  


  0.01 K/uL


(0-0.2)


 


RDW Standard Deviation


  


  43.2 fL


(36.4-46.3)


 


RDW Coefficient of Variation


  


  13.6 %


(11.5-14.5)


 


Immature Granulocyte % (Auto)  0.3 % 


 


Immature Granulocyte # (Auto)


  


  0.01 K/uL


(0.00-0.02)


 


Anion Gap


  


  6.0 mmol/L


(3-11)


 


Est Creatinine Clear Calc


Drug Dose 


  64.4 ml/min 


 


 


Estimated GFR (


American) 


  92.5 


 


 


Estimated GFR (Non-


American 


  79.8 


 


 


BUN/Creatinine Ratio  27.9 (10-20) 


 


Calcium Level


  


  7.6 mg/dl


(8.5-10.1)


 


Troponin I


  


  < 0.015 ng/ml


(0-0.045)


 


Triglycerides Level


  


  58 mg/dl


(0-150)


 


Cholesterol Level


  


  113 mg/dl


(0-200)


 


HDL Cholesterol  46 mg/dl 


 


LDL Cholesterol, Calculated  55 mg/dl 


 


VLDL Cholesterol, Calculated  12 mg/dl 


 


Cholesterol/HDL Ratio  2.5 


 


Thyroid Stimulating Hormone


(TSH) 


  1.030 uIu/ml


(0.300-4.500)











Imaging


I reviewed the images and radiologist's interpretation of the recently 

completed brain MRI. There is evidence of age-related, chronic, microvascular 

ischemic change. The imaging is not suggestive of normal pressure 

hydrocephalus. There is no evidence of acute or subacute stroke. There is mild 

to moderate generalized cortical atrophy.





Impression


This patient's clinical presentation is not highly suggestive of stroke or TIA. 

He probably has an element of chronic mild vascular dementia which may have 

been aggravated in the context of recent GI bleeding and anemia. His 

examination is not suggestive of Parkinson's disease or normal pressure 

hydrocephalus. His gait does not appear to be magnetic or shuffling at this 

time. He does have a moderate distal sensory loss to vibration and may have a 

mild sensory ataxia due to peripheral neuropathy. His gait function could also 

be negatively affected by moderate cerebrovascular disease.





Plan


I do not really have any further specific immediate recommendations for 

additional neurological evaluation and treatment. However, further outpatient 

follow-up for this patient's mild cognitive dysfunction would be reasonable. He 

could potentially benefit from a trial of a cholinesterase inhibitor.





Please contact me if I may be of further assistance.

## 2017-06-14 NOTE — DIAGNOSTIC IMAGING REPORT
Brain MRI WITHOUT CONTRAST



HISTORY: Headache. Change in mental status.  Stroke



TECHNIQUE: Multiplanar multisequence MRI of the brain was performed without the

use of contrast.



COMPARISON STUDY:  Head CT 6/13/2017.



FINDINGS: There is no mass, hematoma, midline shift, or acute infarct. The

paranasal sinuses are clear. The mastoid air cells are clear. The ventricles and

sulci demonstrate mild age-related involutional changes. Scattered foci of T2

hyperintensity seen within the periventricular and subcortical white matter are

nonspecific but suggestive of mild microvascular ischemic changes. The major

vascular flow voids at the skull base are well-maintained. Old lacunar infarct

seen within the nereida.



IMPRESSION:  

No acute intracranial abnormality. Scattered foci of T2 hyperintensity seen

within the periventricular and subcortical white matter are nonspecific but

favor microvascular ischemic change. 







Electronically signed by:  Charlie Conley M.D.

6/14/2017 7:28 AM



Dictated Date/Time:  6/14/2017 7:23 AM

## 2017-06-14 NOTE — ECHOCARDIOGRAM REPORT
*NOTICE TO RECEIVING PARTY AGENCY**  This information is strictly Confidential and protected under 
[place] law.  [place] law prohibits you from making any further disclosure of this 
information unless further disclosure is expressly permitted by the written consent of the person to 
whom it pertains or is authorized by law.  A general authorization for the release of medical or 
other information is not sufficient for this purpose.  Hospital accepts no responsibility if the 
information is made available to any other person, INCLUDING THE PATIENT.



Interpretation Summary

  *  Name: JEANETTE CASTAÑEDA JR  Study Date: 2017 06:26 AM  BP: 118/57 mmHg

  *  MRN: [id number]  Patient Location: 42  HR: 68

  *  : 1933 (M/d/yyyy)  Gender: Male  Height: 69 in

  *  Age: 83 yrs  Ethnicity: [place]  Weight: 178 lb

  *  Ordering Physician: SHY FLORES DO

  *  Performed By: Antonia Acuna RCS

  *  Accession# [id number]  Account# [id number]

  *  Reason For Study: TIA

  *  BSA: 2.0 m2

  *  -- Conclusions --

  *  1. Normal left ventricular size and systolic function. EF 60-65%. No regional wall motion 
abnormalities. Mild concentric left ventricular hypertrophy. Type I diastolic dysfunction.

  *  2. Right to left inter atrial shunt noted following agitated saline injection. Consider PFO.

  *  3. No significant valvular abnormalities.

  *  4. Normal estimated right ventricular systolic pressure, assuming right atrial pressure of 3 
mmHg. (RVSP 31 mmHg).

  *  5. No prior study available for comparison.

Procedure Details

  *  A complete two-dimensional transthoracic echocardiogram was performed (2D, M-mode, Doppler and 
color flow Doppler).

  *  A saline contrast injection was performed to assess for cardiac shunting.

  *  The injection was performed through an intravenous line in the right arm.

  *  The attending nurse who injected the saline contrast was LUCY RANDHAWA RN.

  *  A total of 30 cc of agitated saline was given.

Left Ventricle

  *  Normal left ventricular size and systolic function. EF 60-65%. No regional wall motion 
abnormalities. Mild concentric left ventricular hypertrophy. Type I diastolic dysfunction.

Right Ventricle

  *  The right ventricle is normal in size and function.

  *  The right ventricular systolic function is normal as assessed by tricuspid annular plane 
systolic excursion (TAPSE) (normal >1.5 cm).

Atria

  *  The left atrial size is normal.

  *  Right atrial size is normal.

  *  No obvious ASD visualized.  Right to left inter atrial shunt noted following agitated saline 
injection.

Mitral Valve

  *  The mitral valve is grossly normal.

  *  There is no mitral valve stenosis.

  *  There is trace mitral regurgitation.

Tricuspid Valve

  *  The tricuspid valve is not well visualized, but is grossly normal.

  *  There is no tricuspid stenosis.

  *  There is trace tricuspid regurgitation.

Aortic Valve

  *  The aortic valve is not well visualized.

  *  No hemodynamically significant valvular aortic stenosis.

  *  No aortic regurgitation is present.

Pulmonic Valve

  *  The pulmonary valve is inadequately visualized, but the Doppler data is adequate for 
interpretation.

  *  There is no pulmonic valvular stenosis.

  *  There is no significant pulmonary regurgitation.

Great Vessels

  *  The aortic root is normal size.

  *  Aortic arch of normal dimension.

Pericardium/Pleural

  *  There is no pericardial effusion.

Great Vessels

  *  Normal inferior vena cava size and collapsability with sniff indicates a normal right atrial 
pressure of 3 mmHg

Left Ventricular Diastolic Function

  *  Grade I diastolic dysfunction, (abnormal relaxation pattern).



MMode 2D Measurements and Calculations

IVSd 1.3 cm

IVSs 1.5 cm



LVIDd 4.3 cm

LVIDs 2.7 cm

LVPWd 1.3 cm

LVPWs 1.3 cm



IVS/LVPW 1.0 

FS 36.9 %

EDV(Teich) 82.6 ml

ESV(Teich) 27.1 ml

EF(Teich) 67.1 %



EDV(cubed) 78.9 ml

ESV(cubed) 19.8 ml

EF(cubed) 74.9 %

% IVS thick 14.3 %

% LVPW thick 6.8 %





LV mass(C)d 200.7 grams

LV mass(C)dI 102.1 grams/m\S\2

LV mass(C)s 124.2 grams

LV mass(C)sI 63.2 grams/m\S\2



SV(Teich) 55.4 ml

SI(Teich) 28.2 ml/m\S\2

SV(cubed) 59.1 ml

SI(cubed) 30.1 ml/m\S\2



Ao root diam 3.8 cm

Ao root area 11.3 cm\S\2

LA dimension 4.3 cm



LA/Ao 1.1 

LVOT diam 2.1 cm

LVOT area 3.6 cm\S\2





LVAd ap4 33.0 cm\S\2

LVLd ap4 7.4 cm

EDV(MOD-sp4) 114.3 ml

EDV(sp4-el) 124.8 ml

LVAs ap4 19.4 cm\S\2

LVLs ap4 6.2 cm

ESV(MOD-sp4) 51.7 ml

ESV(sp4-el) 51.1 ml

EF(MOD-sp4) 54.7 %

EF(sp4-el) 59.0 %



LVAd ap2 27.4 cm\S\2

LVLd ap2 7.1 cm

EDV(MOD-sp2) 88.5 ml

EDV(sp2-el) 90.2 ml

LVAs ap2 15.1 cm\S\2

LVLs ap2 5.5 cm

ESV(MOD-sp2) 33.6 ml

ESV(sp2-el) 35.3 ml

EF(MOD-sp2) 62.0 %

EF(sp2-el) 60.8 %



LVLd %diff -5.06 %

EDV(MOD-bp) 103.9 ml

LVLs %diff -13.76 %

ESV(MOD-bp) 44.1 ml

EF(MOD-bp) 57.6 %



SV(MOD-sp4) 62.5 ml

SI(MOD-sp4) 31.8 ml/m\S\2





SV(MOD-sp2) 54.9 ml

SI(MOD-sp2) 27.9 ml/m\S\2



SV(MOD-bp) 59.8 ml

SI(MOD-bp) 30.4 ml/m\S\2



SV(sp4-el) 73.6 ml

SI(sp4-el) 37.5 ml/m\S\2



SV(sp2-el) 54.8 ml

SI(sp2-el) 27.9 ml/m\S\2







Doppler Measurements and Calculations

MV E max luis 71.2 cm/sec

MV A max luis 83.6 cm/sec



MV E/A 0.85 



MV P1/2t max luis 75.1 cm/sec

MV P1/2t 73.3 msec

MVA(P1/2t) 3.0 cm\S\2

MV dec slope 300.0 cm/sec\S\2

MV dec time 0.21 sec



Ao V2 max 115.4 cm/sec

Ao max PG 5.3 mmHg

Ao max PG (full) 2.3 mmHg

KIRSTEN(V,A) 2.7 cm\S\2

KIRSTEN(V,D) 2.7 cm\S\2





LV V1 max PG 3.0 mmHg



LV V1 max 86.3 cm/sec



TR max luis 265.4 cm/sec

## 2017-06-14 NOTE — MEDICAL STUDENT: [facility]
Consultation


Date of Consultation:


Jun 14, 2017.


Attending Physician:


Faustino Mckeon MD


Reason for Consultation:


altered mental status


History of Present Illness


Mr. Walton is an 83-year-old male who presented to the emergency room after a 

week of rectal bleeding. Blood was found in his bowel movement, in the toilet 

water, and on the tissue after wiping since early last week and per his PCP, he 

arrived yesterday evening with his wife. He was also noted to have altered 

mental status. His wife notes that he has had some cognitive decline for the 

past 3-4 years, but says that she has noticed increased episodes of 

"befuddlement" this month, including forgetfulness when starting conversation, 

expressing more anger than usual when using a computer, and increasing 

confusion.





Past Medical/Surgical History


Medical History:


Patient's medical history is significant for BPH.


Surgical History:


Positive for a cholecystectomy.





Social History


Smoking Status:  Never Smoker


History of Alcohol Use:  Yes


Marital Status:  


Occupation Status:  employed


former [employer] 





Allergies


Coded Allergies:  


     No Known Allergies (Verified , 6/13/17)





Medications





Current Inpatient Medications








 Medications


  (Trade)  Dose


 Ordered  Sig/Bobbi


 Route  Start Time


 Stop Time Status Last Admin


Dose Admin


 


 Atorvastatin


 Calcium


  (Lipitor Tab)  40 mg  QAM


 PO  6/14/17 09:00


 7/14/17 08:59  6/14/17 08:55


40 MG


 


 Miscellaneous


 Information


  (Pharmacist


 Discharge Med Rec


 Consult)  1 ea  UD  PRN


 N/A  6/13/17 21:15


 7/13/17 21:14   


 


 


 Sodium Chloride  1,000 ml @ 


 100 mls/hr  Q10H


 IV  6/13/17 22:45


 7/13/17 22:44  6/14/17 08:54


100 MLS/HR


 


 Acetaminophen


  (Tylenol Tab)  650 mg  Q4H  PRN


 PO  6/13/17 21:15


 7/13/17 21:14   


 


 


 Ondansetron HCl


  (Zofran Inj)  4 mg  Q6H  PRN


 IV  6/13/17 21:15


 7/13/17 21:14   


 


 


 Polyethylene


  (Miralax Powder


 Packet)  17 gm  DAILY  PRN


 PO  6/13/17 21:15


 7/13/17 21:14   


 


 


 Albuterol


  (Ventolin Hfa


 Inhaler)  2 puffs  Q6H  PRN


 INH  6/13/17 23:00


 7/13/17 22:59   


 


 


 Finasteride


  (Proscar Tab)  5 mg  DAILY


 PO  6/14/17 09:00


 7/14/17 08:59  6/14/17 08:55


5 MG


 


 Salmeterol


 Xinafoate/


 Fluticasone


  (Advair Diskus


 500/50 Inh)  1 puff  BID


 INH  6/14/17 09:00


 7/14/17 08:59  6/14/17 08:54


1 PUFF


 


 Montelukast Sodium


  (Singulair Tab)  10 mg  DAILY


 PO  6/14/17 09:00


 7/14/17 08:59  6/14/17 08:55


10 MG


 


 Tamsulosin HCl


  (Flomax Cap)  0.4 mg  DAILY


 PO  6/14/17 09:00


 7/14/17 08:59  6/14/17 08:56


0.4 MG


 


 Pantoprazole


 Sodium


  (Protonix Tab)  40 mg  QAM


 PO  6/14/17 09:00


 7/14/17 08:59  6/14/17 08:56


40 MG


 


 Loratadine


  (Claritin Tab)  10 mg  HS


 PO  6/14/17 21:00


 7/14/17 20:59  6/14/17 08:56


10 MG


 


 Ranitidine HCl


  (zANTac TAB)  300 mg  HS


 PO  6/14/17 21:00


 7/14/17 20:59  6/14/17 08:55


300 MG


 


 Polyethylene


 Glycol/


 Electrolytes


  (Golytely Soln)  8 dose  TODAY@0300,1800


 PO  6/14/17 18:00


 6/15/17 03:01   


 











Physical Exam











  Date Time  Temp Pulse Resp B/P (MAP) Pulse Ox O2 Delivery O2 Flow Rate FiO2


 


6/14/17 08:00     96 Room Air  


 


6/14/17 07:55 37.5 70 16 88/42 (57) 96 Room Air  





    121/66 (84)    


 


6/14/17 04:00      Room Air  


 


6/14/17 02:54 38.4 74 16 118/57 (77) 94 Room Air  


 


6/13/17 23:59      Room Air  


 


6/13/17 23:00 36.8 55 18 129/63 97 Room Air  


 


6/13/17 21:17  69 18 106/59 96 Room Air  


 


6/13/17 21:15  68      


 


6/13/17 18:39  71 18 127/65 95 Room Air  


 


6/13/17 18:00  75 16 121/66 94 Room Air  


 


6/13/17 17:10  79      


 


6/13/17 17:01     94 Room Air  


 


6/13/17 16:50 37.5 63 16 118/70 98 Room Air  








Eyes:  bilateral eyes normal inspection, bilateral eyes PERRL, bilateral eyes 

EOMI





Mr. Walton was awake and alert. He was pleasant to talk to and we chatted about 

etymology. He had a tendency to ramble but his wife reported during our 

conversation that he has always been "long-winded." Mood and affect seem 

appropriate, although at home, his wife has recently noticed increased 

intermittent episodes of anger. Fund of knowledge is intact. Grooming and 

hygiene are appropriate. Speech is absent of any aphasia or dysarthria and is 

fluent, spontaneous, and has normal rate and rhythm. When told to repeat three 

given words and recall them a few minutes later, he could only remember one out 

of three words. Attention appeared to be normal based on his ability to recite 

the months of the year forwards and backwards and spell WORLD backwards. However

, he made 1-2 mistakes counting backwards serially from 100 by seven. 





CN II - visual fields intact. Pupils were equally round and reactive to light. 

A direct & consensual response was elicited bilaterally.


CN III, IV, VI - extraocular movements are full and intact. Accommodation 

appeared normal. 


CN V - slightly decreased sensation on the right side in the V1 distribution. 

Otherwise sensation was normal. 


CN VII - normal facial symmetry and strength bilaterally.


CN VIII - finger rub heard bilaterally. Mr. Walton was wearing his hearing aids 

in both ears. 


CN IX, X - palate elevated bilaterally.


CN XI - 5/5 shoulder shrug strength against resistance. 


CN XII - midline tongue. 5/5 strength with lateral movement against resistance.





Motor strength was normal bilaterally in the upper extremity, with 5/5 strength 

demonstrated in the deltoids, biceps, triceps, wrist extensors & flexors, and 

intrinsic hand muscles. Lower extremity strength was 5/5 bilaterally in the hip 

flexors, gastrocnemius, and tibialis anterior. Bulk and tone were normal. No 

rigidity was present. 





2/4 reflexes were elicited in the right & left biceps, brachioradialis, and 

triceps. In the lower extremity, reflexes were 2/4 in the right knee and ankle 

and 0/4 in the left knee and ankle.





On sensory exam, sensation of light touch was diminished in the right forearm 

as compared to the left forearm but otherwise noted elsewhere bilaterally. 

Vibration sense is diminished distally on the left side (could not detect 

vibrating tuning fork on distal joint of left great toe) but was otherwise 

normal in all four extremities. 





No dysmetria or ataxia noted during finger-to-nose testing. Gait was slow but 

otherwise normal without any shuffling or magnetic quality.





Laboratory Results





Last 24 Hours








Test


  6/13/17


17:50 6/14/17


02:50 6/14/17


11:09


 


White Blood Count 3.65 K/uL  3.75 K/uL  


 


Red Blood Count 4.34 M/uL  4.19 M/uL  


 


Hemoglobin 12.9 g/dL  12.4 g/dL  


 


Hematocrit 37.3 %  36.3 %  


 


Mean Corpuscular Volume 85.9 fL  86.6 fL  


 


Mean Corpuscular Hemoglobin 29.7 pg  29.6 pg  


 


Mean Corpuscular Hemoglobin


Concent 34.6 g/dl 


  34.2 g/dl 


  


 


 


Platelet Count 105 K/uL  101 K/uL  


 


Mean Platelet Volume 8.7 fL  9.7 fL  


 


Neutrophils (%) (Auto) 76.4 %  73.6 %  


 


Lymphocytes (%) (Auto) 17.0 %  15.7 %  


 


Monocytes (%) (Auto) 5.5 %  9.6 %  


 


Eosinophils (%) (Auto) 0.5 %  0.5 %  


 


Basophils (%) (Auto) 0.3 %  0.3 %  


 


Neutrophils # (Auto) 2.79 K/uL  2.76 K/uL  


 


Lymphocytes # (Auto) 0.62 K/uL  0.59 K/uL  


 


Monocytes # (Auto) 0.20 K/uL  0.36 K/uL  


 


Eosinophils # (Auto) 0.02 K/uL  0.02 K/uL  


 


Basophils # (Auto) 0.01 K/uL  0.01 K/uL  


 


RDW Standard Deviation 42.4 fL  43.2 fL  


 


RDW Coefficient of Variation 13.5 %  13.6 %  


 


Immature Granulocyte % (Auto) 0.3 %  0.3 %  


 


Immature Granulocyte # (Auto) 0.01 K/uL  0.01 K/uL  


 


Prothrombin Time 11.3 SECONDS   


 


Prothromb Time International


Ratio 1.1 


  


  


 


 


Sodium Level 136 mmol/L  135 mmol/L  


 


Potassium Level 3.9 mmol/L  3.7 mmol/L  


 


Chloride Level 101 mmol/L  101 mmol/L  


 


Carbon Dioxide Level 27 mmol/L  28 mmol/L  


 


Anion Gap 8.0 mmol/L  6.0 mmol/L  


 


Blood Urea Nitrogen 24 mg/dl  24 mg/dl  


 


Creatinine 0.93 mg/dl  0.87 mg/dl  


 


Estimated GFR (


American) 87.7 


  92.5 


  


 


 


Estimated GFR (Non-


American 75.7 


  79.8 


  


 


 


BUN/Creatinine Ratio 25.5  27.9  


 


Random Glucose 112 mg/dl  101 mg/dl  


 


Estimated Average Glucose 117 mg/dl   


 


Hemoglobin A1c 5.7 %   


 


Lactic Acid Level 0.8 mmol/L   


 


Calcium Level 8.4 mg/dl  7.6 mg/dl  


 


Total Bilirubin 0.8 mg/dl   


 


Aspartate Amino Transf


(AST/SGOT) 29 U/L 


  


  


 


 


Alanine Aminotransferase


(ALT/SGPT) 29 U/L 


  


  


 


 


Alkaline Phosphatase 44 U/L   


 


Total Protein 6.6 gm/dl   


 


Albumin 3.4 gm/dl   


 


Globulin 3.2 gm/dl   


 


Albumin/Globulin Ratio 1.1   


 


Lipase 188 U/L   


 


Est Creatinine Clear Calc


Drug Dose 


  64.4 ml/min 


  


 


 


Troponin I  < 0.015 ng/ml  


 


Triglycerides Level  58 mg/dl  


 


Cholesterol Level  113 mg/dl  


 


HDL Cholesterol  46 mg/dl  


 


LDL Cholesterol, Calculated  55 mg/dl  


 


VLDL Cholesterol, Calculated  12 mg/dl  


 


Cholesterol/HDL Ratio  2.5  


 


Thyroid Stimulating Hormone


(TSH) 


  1.030 uIu/ml 


  


 











Assessment & Plan


Problem List:  





Altered mental status





Rectal bleeding





Assessment





1) Episode of acute confusion/altered mental status related to GI bleed.





While a TIA is possible, given absence of findings on MRI, no other focal 

neurologic deficits are observed, nor were any observed or reported during 

episodes of altered mental status. Other diagnoses to explain confusion could 

include complex partial seizure and a Wernicke's aphasia, but neither are 

likely in this patient specifically since no pertinent deficits were observed 

for either. Patient appears to be mostly at baseline.





2) Mild vascular dementia.





Slight cognitive decline was noted on exam with short-term recall. Patient has 

insight into this decline, noting that his thinking is more slowed and 

cognizant his memory is decreased. Additionally, chronic microvascular changes 

were observed on MRI. 





Plan





1) Follow up in the outpatient setting for vascular dementia.





Could be a candidate for pharmacologic therapy such as an acetylcholinesterase 

inhibitor or an NMDA receptor antagonist.

## 2017-06-14 NOTE — GASTROINTESTINAL CONSULTATION
Gastrointestinal Consultation


Date of Consultation:  Jun 14, 2017


Attending Physician:  Dr. Reese


Consulting Physician:  Dr. Chamorro/SUSANNAH Rand


Reason for Consultation:  Lower GIB


History of Present Illness


Patient is a 83 year old male with a history of GERD, BPH and constipation 

admitted last evening with complaints of rectal bleeding and "not being coherent

". The patient states that the rectal bleeding has been ongoing intermittently 

for approximately one week. The bleeding is described as bright red, filling 

the toilet bowel mixed with dark blood. He denies any rectal pain or painful 

bowel movements. He further denies any abdominal pain, nausea or vomiting or 

melanotic stool. The patient reports having harder stools prior to the onset 

but states he increased his water consumption to 6-8 glasses per day and stools 

became more soft. This did not, however, stop the rectal bleeding. He presented 

to the DEM one week ago at the onset of bleeding. His H&H was noted to be 14.4/

37.3. An abdominal series was performed demonstrating "moderate constipation". 

A rectal exam was performed which "did not show any source of bleeding". There 

were no visible hemorrhoids or fissures per anoscopy at that time. H&H on 

admission was noted to drop to 12.9 and 37.3 with slight hemoglobin drop again 

to 12.4 this morning. No bleeding or any bowel movements over night. He was 

seen by Neurology this morning. No acute neurological issues. Last colonoscopy 

was performed in 2014. A non-adenomatous polyp was removed at that time. Risk 

factors: diverticulosis and aspirin use. Family history is negative for 

colorectal cancer.





Past Medical/Surgical History


Medical Problems:


(1) Altered mental status


Status: Acute  





(2) Altered mental status


Status: Acute  





(3) Fever


Status: Acute  





(4) Lower GI bleeding


Status: Acute  








Past Medical History:


1. GERD


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


3. Asthma


4. BPH


5. Diverticulitis


6. Hearing loss


7. Infectious discitis


8. Vitamin D deficiency


Past Surgical History:


1. Cataract surgery


2. Cholecystectomy


3. Foot surgery


4. Prostate biopsy


5. Complete colonoscopy





Family History





Cancer


Heart disease


see HPI





Social History


Smoking Status:  Never Smoker


Alcohol Use:  occasionally


Marital Status:  


Housing Status:  lives with family


Occupation Status:  employed





Allergies


Coded Allergies:  


     No Known Allergies (Verified , 6/13/17)





Current Medications





Home Meds and Scripts








 Medications  Dose


 Route/Sig


 Max Daily Dose Days Date Category


 


 Flomax


  (Tamsulosin Hcl)


 0.4 Mg Cap  0.4 Mg


 PO DAILY


    6/7/17 Reported


 


 Allergy Relief


  (Loratadine) 10


 Mg Tab  10 Mg


 PO HS


    6/7/17 Reported


 


 Zantac


  (Ranitidine HCl)


 300 Mg Tab  300 Mg


 PO HS


    6/7/17 Reported


 


 Advair Diskus


 500/50 60 Dose


  (Fluticasone


 Prop/Salmeterol)


 1 Ea Aerp  1 Puff


 INH BID


    6/7/17 Reported


 


 Ventolin Hfa


  (Albuterol) 200


 Puffs/09060 Mcg


 Aers  2-4 Puffs


 INH Q6H PRN


    6/7/17 Reported


 


 Centrum Adults


  (Multiple


 Vitamins W/


 Minerals) 1 Tab


 Tab  1 Tab


 PO DAILY


    6/7/17 Reported


 


 Prevacid


  (Lansoprazole) 30


 Mg Capcr  30 Mg


 PO DAILY


    6/7/17 Reported


 


 Singulair


  (Montelukast


 Sodium) 10 Mg Tab  10 Mg


 PO DAILY


    6/7/17 Reported


 


 Proscar


  (Finasteride) 5


 Mg Tab  5 Mg


 PO DAILY


    6/7/17 Reported


 


 Vitamin D3


  (Cholecalciferol)


 1,000 Unit Tab  1,000 Units


 PO DAILY


    6/7/17 Reported


 


 Vitamin C


  (Ascorbic Acid)


 500 Mg Tab  500 Mg


 PO DAILY


    6/7/17 Reported











Review of Systems


Constitutional:  + fatigue, No fever, No chills


Eyes:  No problem reported


ENT:  + hearing loss


Respiratory:  No problem reported


Cardiac:  No problem reported


Abdomen:  + see HPI


Musculoskeletal:  No joint pain, No muscle pain


Male :  No problem reported


Neuro:  No numbness/tingling, No balance problems


Psych:  No problem reported


Skin:  No problem reported





Physical Exam











  Date Time  Temp Pulse Resp B/P (MAP) Pulse Ox O2 Delivery O2 Flow Rate FiO2


 


6/14/17 07:55 37.5 70 16 88/42 (57) 96 Room Air  





    121/66 (84)    


 


6/14/17 04:00      Room Air  


 


6/14/17 02:54 38.4 74 16 118/57 (77) 94 Room Air  


 


6/13/17 23:59      Room Air  


 


6/13/17 23:00 36.8 55 18 129/63 97 Room Air  


 


6/13/17 21:17  69 18 106/59 96 Room Air  


 


6/13/17 21:15  68      


 


6/13/17 18:39  71 18 127/65 95 Room Air  


 


6/13/17 18:00  75 16 121/66 94 Room Air  


 


6/13/17 17:10  79      


 


6/13/17 17:01     94 Room Air  


 


6/13/17 16:50 37.5 63 16 118/70 98 Room Air  








General Appearance:  no apparent distress


Eyes:  EOMI


ENT:  + pertinent finding (hard of hearing)


Neck:  supple


Respiratory/Chest:  lungs clear, normal breath sounds, no respiratory distress


Cardiovascular:  regular rate, rhythm, no gallop, no murmur


Abdomen:  normal bowel sounds, non tender, soft


Extremities:  no pedal edema


Neurologic/Psych:  alert, normal mood/affect, oriented x 3


Skin:  warm/dry





Laboratory Results





Last 24 Hours








Test


  6/13/17


17:50 6/14/17


02:50


 


White Blood Count 3.65 K/uL  3.75 K/uL 


 


Red Blood Count 4.34 M/uL  4.19 M/uL 


 


Hemoglobin 12.9 g/dL  12.4 g/dL 


 


Hematocrit 37.3 %  36.3 % 


 


Mean Corpuscular Volume 85.9 fL  86.6 fL 


 


Mean Corpuscular Hemoglobin 29.7 pg  29.6 pg 


 


Mean Corpuscular Hemoglobin


Concent 34.6 g/dl 


  34.2 g/dl 


 


 


Platelet Count 105 K/uL  101 K/uL 


 


Mean Platelet Volume 8.7 fL  9.7 fL 


 


Neutrophils (%) (Auto) 76.4 %  73.6 % 


 


Lymphocytes (%) (Auto) 17.0 %  15.7 % 


 


Monocytes (%) (Auto) 5.5 %  9.6 % 


 


Eosinophils (%) (Auto) 0.5 %  0.5 % 


 


Basophils (%) (Auto) 0.3 %  0.3 % 


 


Neutrophils # (Auto) 2.79 K/uL  2.76 K/uL 


 


Lymphocytes # (Auto) 0.62 K/uL  0.59 K/uL 


 


Monocytes # (Auto) 0.20 K/uL  0.36 K/uL 


 


Eosinophils # (Auto) 0.02 K/uL  0.02 K/uL 


 


Basophils # (Auto) 0.01 K/uL  0.01 K/uL 


 


RDW Standard Deviation 42.4 fL  43.2 fL 


 


RDW Coefficient of Variation 13.5 %  13.6 % 


 


Immature Granulocyte % (Auto) 0.3 %  0.3 % 


 


Immature Granulocyte # (Auto) 0.01 K/uL  0.01 K/uL 


 


Prothrombin Time 11.3 SECONDS  


 


Prothromb Time International


Ratio 1.1 


  


 


 


Sodium Level 136 mmol/L  135 mmol/L 


 


Potassium Level 3.9 mmol/L  3.7 mmol/L 


 


Chloride Level 101 mmol/L  101 mmol/L 


 


Carbon Dioxide Level 27 mmol/L  28 mmol/L 


 


Anion Gap 8.0 mmol/L  6.0 mmol/L 


 


Blood Urea Nitrogen 24 mg/dl  24 mg/dl 


 


Creatinine 0.93 mg/dl  0.87 mg/dl 


 


Estimated GFR (


American) 87.7 


  92.5 


 


 


Estimated GFR (Non-


American 75.7 


  79.8 


 


 


BUN/Creatinine Ratio 25.5  27.9 


 


Random Glucose 112 mg/dl  101 mg/dl 


 


Estimated Average Glucose 117 mg/dl  


 


Hemoglobin A1c 5.7 %  


 


Lactic Acid Level 0.8 mmol/L  


 


Calcium Level 8.4 mg/dl  7.6 mg/dl 


 


Total Bilirubin 0.8 mg/dl  


 


Aspartate Amino Transf


(AST/SGOT) 29 U/L 


  


 


 


Alanine Aminotransferase


(ALT/SGPT) 29 U/L 


  


 


 


Alkaline Phosphatase 44 U/L  


 


Total Protein 6.6 gm/dl  


 


Albumin 3.4 gm/dl  


 


Globulin 3.2 gm/dl  


 


Albumin/Globulin Ratio 1.1  


 


Lipase 188 U/L  


 


Est Creatinine Clear Calc


Drug Dose 


  64.4 ml/min 


 


 


Troponin I  < 0.015 ng/ml 


 


Triglycerides Level  58 mg/dl 


 


Cholesterol Level  113 mg/dl 


 


HDL Cholesterol  46 mg/dl 


 


LDL Cholesterol, Calculated  55 mg/dl 


 


VLDL Cholesterol, Calculated  12 mg/dl 


 


Cholesterol/HDL Ratio  2.5 


 


Thyroid Stimulating Hormone


(TSH) 


  1.030 uIu/ml 


 











Impression


Patient is a 83 year old male with bright red rectal bleeding and acute blood 

loss anemia.





Diff dx: hemorrhoid/fissure (related to known constipation) vs diverticular vs 

AVM vs PUD vs malignancy vs other.





Plan


1. Clear liquid diet today.


2. GoLytely bowel prep this evening.


3. NPO after midnight except for medications.


4. Colonoscopy by Dr. Chamorro tomorrow for further evaluation of bleeding.


5. Supportive care per primary team.





Thank you for allowing us to participate in the care of this pleasant patient. 

If you have any questions or concerns, please do not hesitate to contact us.





Agree with SUSANNAH Rand as above


Abd:  Soft, NT, ND, +BS


Colonoscopy on 6/15/2017

## 2017-06-14 NOTE — DIAGNOSTIC IMAGING REPORT
MRA OF THE NECK WITHOUT CONTRAST



CLINICAL HISTORY: Weakness. Stroke.    



COMPARISON STUDY: None.



TECHNIQUE:  A 1.5 Ct magnet was utilized.  2-D and 3-D time-of-flight imaging

was performed to obtain unenhanced MRA of the neck.  NASCET criteria were

utilized to estimate the degree of carotid stenosis. 



FINDINGS: No significant stenosis is identified within the bilateral common

carotid, internal carotid or vertebral arteries although vessel origins are

suboptimally assessed on this unenhanced exam. The sensitivity for dissection is

diminished on this unenhanced exam but no dissection is identified.



IMPRESSION:  No significant stenosis within the bilateral common carotid,

internal carotid or vertebral arteries although vessel origins are suboptimally

assessed on this unenhanced study.







Electronically signed by:  Fabrizio Ruth M.D.

6/14/2017 7:28 AM



Dictated Date/Time:  6/14/2017 7:27 AM

## 2017-06-15 VITALS
OXYGEN SATURATION: 98 % | HEART RATE: 70 BPM | SYSTOLIC BLOOD PRESSURE: 120 MMHG | DIASTOLIC BLOOD PRESSURE: 56 MMHG | TEMPERATURE: 98.06 F

## 2017-06-15 VITALS — OXYGEN SATURATION: 96 %

## 2017-06-15 VITALS
HEART RATE: 67 BPM | DIASTOLIC BLOOD PRESSURE: 60 MMHG | OXYGEN SATURATION: 93 % | TEMPERATURE: 98.24 F | SYSTOLIC BLOOD PRESSURE: 117 MMHG

## 2017-06-15 VITALS
DIASTOLIC BLOOD PRESSURE: 55 MMHG | TEMPERATURE: 98.6 F | HEART RATE: 60 BPM | OXYGEN SATURATION: 95 % | SYSTOLIC BLOOD PRESSURE: 113 MMHG

## 2017-06-15 VITALS — OXYGEN SATURATION: 95 %

## 2017-06-15 VITALS
TEMPERATURE: 98.96 F | HEART RATE: 66 BPM | OXYGEN SATURATION: 95 % | SYSTOLIC BLOOD PRESSURE: 121 MMHG | DIASTOLIC BLOOD PRESSURE: 73 MMHG

## 2017-06-15 VITALS
SYSTOLIC BLOOD PRESSURE: 93 MMHG | HEART RATE: 71 BPM | TEMPERATURE: 98.42 F | DIASTOLIC BLOOD PRESSURE: 53 MMHG | OXYGEN SATURATION: 96 %

## 2017-06-15 VITALS
HEART RATE: 68 BPM | SYSTOLIC BLOOD PRESSURE: 116 MMHG | DIASTOLIC BLOOD PRESSURE: 63 MMHG | TEMPERATURE: 100.94 F | OXYGEN SATURATION: 94 %

## 2017-06-15 LAB
ANION GAP SERPL CALC-SCNC: 7 MMOL/L (ref 3–11)
BASOPHILS # BLD: 0.02 K/UL (ref 0–0.2)
BASOPHILS NFR BLD: 0.6 %
BUN SERPL-MCNC: 19 MG/DL (ref 7–18)
BUN/CREAT SERPL: 21.6 (ref 10–20)
CALCIUM SERPL-MCNC: 7.2 MG/DL (ref 8.5–10.1)
CHLORIDE SERPL-SCNC: 102 MMOL/L (ref 98–107)
CO2 SERPL-SCNC: 27 MMOL/L (ref 21–32)
COMPLETE: YES
CREAT CL PREDICTED SERPL C-G-VRATE: 62.9 ML/MIN
CREAT SERPL-MCNC: 0.89 MG/DL (ref 0.6–1.4)
EOSINOPHIL NFR BLD AUTO: 87 K/UL (ref 130–400)
GLUCOSE SERPL-MCNC: 93 MG/DL (ref 70–99)
HCT VFR BLD CALC: 33.6 % (ref 42–52)
IG%: 0.3 %
IMM GRANULOCYTES NFR BLD AUTO: 28.2 %
LYMPHOCYTES # BLD: 0.87 K/UL (ref 1.2–3.4)
MCH RBC QN AUTO: 29 PG (ref 25–34)
MCHC RBC AUTO-ENTMCNC: 33.9 G/DL (ref 32–36)
MCV RBC AUTO: 85.5 FL (ref 80–100)
MONOCYTES NFR BLD: 9.1 %
NEUTROPHILS # BLD AUTO: 1.9 %
NEUTROPHILS NFR BLD AUTO: 59.9 %
PLATELET # BLD EST: (no result) 10*3/UL
PMV BLD AUTO: 9.8 FL (ref 7.4–10.4)
POTASSIUM SERPL-SCNC: 4 MMOL/L (ref 3.5–5.1)
RBC # BLD AUTO: 3.93 M/UL (ref 4.7–6.1)
SODIUM SERPL-SCNC: 136 MMOL/L (ref 136–145)
WBC # BLD AUTO: 3.09 K/UL (ref 4.8–10.8)

## 2017-06-15 RX ADMIN — TAMSULOSIN HYDROCHLORIDE SCH MG: 0.4 CAPSULE ORAL at 08:17

## 2017-06-15 RX ADMIN — Medication SCH MG: at 19:48

## 2017-06-15 RX ADMIN — FINASTERIDE SCH MG: 5 TABLET, FILM COATED ORAL at 08:17

## 2017-06-15 RX ADMIN — SODIUM CHLORIDE SCH MLS/HR: 900 INJECTION, SOLUTION INTRAVENOUS at 13:29

## 2017-06-15 RX ADMIN — MONTELUKAST SODIUM SCH MG: 10 TABLET, FILM COATED ORAL at 08:18

## 2017-06-15 RX ADMIN — PANTOPRAZOLE SCH MG: 40 TABLET, DELAYED RELEASE ORAL at 08:17

## 2017-06-15 RX ADMIN — POLYETHYLENE GLYCOL 3350, SODIUM SULFATE ANHYDROUS, SODIUM BICARBONATE, SODIUM CHLORIDE, POTASSIUM CHLORIDE SCH DOSE: 236; 22.74; 6.74; 5.86; 2.97 POWDER, FOR SOLUTION ORAL at 03:12

## 2017-06-15 RX ADMIN — FLUTICASONE PROPIONATE AND SALMETEROL SCH PUFF: 50; 500 POWDER RESPIRATORY (INHALATION) at 08:16

## 2017-06-15 RX ADMIN — FLUTICASONE PROPIONATE AND SALMETEROL SCH PUFF: 50; 500 POWDER RESPIRATORY (INHALATION) at 19:48

## 2017-06-15 RX ADMIN — RANITIDINE SCH MG: 150 TABLET ORAL at 19:48

## 2017-06-15 RX ADMIN — SODIUM CHLORIDE SCH MLS/HR: 900 INJECTION, SOLUTION INTRAVENOUS at 05:05

## 2017-06-15 RX ADMIN — ATORVASTATIN CALCIUM SCH MG: 40 TABLET, FILM COATED ORAL at 08:18

## 2017-06-15 NOTE — PROGRESS NOTE
Subjective


Date of Service:


Ryan 15, 2017.


Subjective


Pt evaluation today including:  conversation w/ patient, conversation w/ family

, physical exam, chart review, lab review, review of studies, review of 

inpatient medication list


Pt resting comfortably in chair


Denies any worsening confusion, headaches, rectal bleeding


Wife at bedside


Was not able to complete bowel prep





Problem List


Medical Problems:


(1) Altered mental status


Status: Acute  





(2) Altered mental status


Status: Acute  





(3) Fever


Status: Acute  





(4) Lower GI bleeding


Status: Acute  











Review of Systems


Constitutional:  No fever, No chills, No sweats, No weight loss, No weakness


Eyes:  No worsening of vision, No eye pain, No redness, No discharge


ENT:  No hearing loss, No unusual epistaxis, No nasal symptoms, No sore throat


Respiratory:  No cough, No sputum, No wheezing, No shortness of breath


Cardiac:  No chest pain, No orthopnea, No PND, No edema


Abdomen:  No pain, No nausea, No vomiting, No constipation


Musculoskeletal:  No joint pain, No muscle pain, No swelling, No calf pain


Male :  No dysuria, No urinary frequency, No incontinence, No slowing stream


Neurologic:  + memory loss, No paralysis, No weakness, No numbness/tingling


Psychiatric:  No depression symptoms, No anhedonism, No anxiety, No insomnia


Endo:  No fatigue, No excessive thirst


Skin:  No rash, No itch





Objective


Vital Signs











  Date Time  Temp Pulse Resp B/P (MAP) Pulse Ox O2 Delivery O2 Flow Rate FiO2


 


6/15/17 12:00     96 Room Air  


 


6/15/17 11:17 36.9 71 16 93/53 (66) 96   


 


6/15/17 08:00     95 Room Air  


 


6/15/17 07:11 37.2 66 16 121/73 (89) 95 Room Air  


 


6/15/17 04:00      Room Air  


 


6/15/17 03:47 36.8 67 19 117/60 (79) 93 Room Air  


 


6/14/17 23:59      Room Air  


 


6/14/17 23:15 37.2 76 18 104/50 (68) 95 Room Air  


 


6/14/17 20:12 36.3 57 18 108/55 (72) 95 Room Air  


 


6/14/17 20:00      Room Air  


 


6/14/17 16:00      Room Air  


 


6/14/17 15:27 37.4 62 16 103/58 (73) 96 Room Air  











Physical Exam


General Appearance:  WD/WN, no apparent distress


Eyes:  normal inspection, PERRL, EOMI, sclerae normal


Neck:  supple, no adenopathy, thyroid normal, no JVD


Respiratory/Chest:  chest non-tender, lungs clear, normal breath sounds, no 

respiratory distress


Cardiovascular:  regular rate, rhythm, no edema, no gallop, no JVD


Abdomen:  normal bowel sounds, non tender, soft, no organomegaly


Extremities:  normal range of motion, non-tender, normal inspection, no pedal 

edema


Neurologic/Psychiatric:  CNs II-XII nml as tested, no motor/sensory deficits, 

alert, normal mood/affect


Skin:  normal color, warm/dry, no rash


Lymphatic:  no adenopathy





Laboratory Results





Last 24 Hours








Test


  6/15/17


06:10


 


White Blood Count 3.09 K/uL 


 


Red Blood Count 3.93 M/uL 


 


Hemoglobin 11.4 g/dL 


 


Hematocrit 33.6 % 


 


Mean Corpuscular Volume 85.5 fL 


 


Mean Corpuscular Hemoglobin 29.0 pg 


 


Mean Corpuscular Hemoglobin


Concent 33.9 g/dl 


 


 


Platelet Count 87 K/uL 


 


Mean Platelet Volume 9.8 fL 


 


Neutrophils (%) (Auto) 59.9 % 


 


Lymphocytes (%) (Auto) 28.2 % 


 


Monocytes (%) (Auto) 9.1 % 


 


Eosinophils (%) (Auto) 1.9 % 


 


Basophils (%) (Auto) 0.6 % 


 


Neutrophils # (Auto) 1.85 K/uL 


 


Lymphocytes # (Auto) 0.87 K/uL 


 


Monocytes # (Auto) 0.28 K/uL 


 


Eosinophils # (Auto) 0.06 K/uL 


 


Basophils # (Auto) 0.02 K/uL 


 


RDW Standard Deviation 42.2 fL 


 


RDW Coefficient of Variation 13.4 % 


 


Immature Granulocyte % (Auto) 0.3 % 


 


Immature Granulocyte # (Auto) 0.01 K/uL 


 


Platelet Estimate DECREASED 


 


Red Blood Cell Morphology Unremarkable 


 


Sodium Level 136 mmol/L 


 


Potassium Level 4.0 mmol/L 


 


Chloride Level 102 mmol/L 


 


Carbon Dioxide Level 27 mmol/L 


 


Anion Gap 7.0 mmol/L 


 


Blood Urea Nitrogen 19 mg/dl 


 


Creatinine 0.89 mg/dl 


 


Est Creatinine Clear Calc


Drug Dose 62.9 ml/min 


 


 


Estimated GFR (


American) 91.6 


 


 


Estimated GFR (Non-


American 79.1 


 


 


BUN/Creatinine Ratio 21.6 


 


Random Glucose 93 mg/dl 


 


Calcium Level 7.2 mg/dl 











Assessment and Plan


82 yo male presents to the ER with complaints of rectal bleeding and increasing 

forgetfulness for past 10 days





Confusion likely related to mild dementia. Improving per wife reports. No 

source of infections and sign or symptoms of parkinsons dementia. 


CT head/MRI/MRA head and neck unremarkable. Gait intact and no weakness noted. 

Likely anemia from rectal 


bleeding exacerbating dementia, which is slightly worsening today. Neurology 

consulted and recs appreciated. Will also start on


aricept 5 mg PO qhs





Acute blood loss anemia likely related to rectal bleeding, GI consulted, Hg 

dropfrom 12.9 to 11.4 in 2 days. Pt denies any further rectal bleeding.


Cont to hold ASA. Pt not able to complete bowel prep, will plan for colonoscopy 

in AM.





Thrombocytopenia 87, chronic, baseline is around 150





H/o Asthma, continue advair, singulair and albuterol, stable at this time





BPH, cont flomax and proscar





DVT prophylaxis with SCDs only due to rectal bleeding and anemia





DNR code status

## 2017-06-15 NOTE — ANESTHESIOLOGY PROGRESS NOTE
Anesthesia Post Op Note


Date & Time


Ryan 15, 2017 at 09:57





Vital Signs


Pain Intensity:  0.0





Vital Signs Past 12 Hours








  Date Time  Temp Pulse Resp B/P (MAP) Pulse Ox O2 Delivery O2 Flow Rate FiO2


 


6/15/17 07:11 37.2 66 16 121/73 (89) 95 Room Air  


 


6/15/17 04:00      Room Air  


 


6/15/17 03:47 36.8 67 19 117/60 (79) 93 Room Air  


 


6/14/17 23:59      Room Air  


 


6/14/17 23:15 37.2 76 18 104/50 (68) 95 Room Air  











Notes


Mental Status:  alert / awake / arousable, participated in evaluation


Pt Amnestic to Procedure:  Yes


Nausea / Vomiting:  adequately controlled


Pain:  adequately controlled


Airway Patency, RR, SpO2:  stable & adequate


BP & HR:  stable & adequate


Hydration State:  stable & adequate


Anesthetic Complications:  no major complications apparent

## 2017-06-16 VITALS
HEART RATE: 63 BPM | TEMPERATURE: 98.96 F | DIASTOLIC BLOOD PRESSURE: 57 MMHG | SYSTOLIC BLOOD PRESSURE: 100 MMHG | OXYGEN SATURATION: 100 %

## 2017-06-16 VITALS
HEART RATE: 62 BPM | DIASTOLIC BLOOD PRESSURE: 49 MMHG | SYSTOLIC BLOOD PRESSURE: 94 MMHG | OXYGEN SATURATION: 97 % | TEMPERATURE: 99.14 F

## 2017-06-16 VITALS
DIASTOLIC BLOOD PRESSURE: 60 MMHG | TEMPERATURE: 97.34 F | HEART RATE: 85 BPM | SYSTOLIC BLOOD PRESSURE: 117 MMHG | OXYGEN SATURATION: 97 %

## 2017-06-16 VITALS
TEMPERATURE: 99.14 F | HEART RATE: 62 BPM | SYSTOLIC BLOOD PRESSURE: 94 MMHG | OXYGEN SATURATION: 97 % | DIASTOLIC BLOOD PRESSURE: 49 MMHG

## 2017-06-16 VITALS — OXYGEN SATURATION: 97 %

## 2017-06-16 VITALS — OXYGEN SATURATION: 100 %

## 2017-06-16 LAB
ANION GAP SERPL CALC-SCNC: 9 MMOL/L (ref 3–11)
BASOPHILS # BLD: 0.01 K/UL (ref 0–0.2)
BASOPHILS NFR BLD: 0.4 %
BUN SERPL-MCNC: 14 MG/DL (ref 7–18)
BUN/CREAT SERPL: 17.5 (ref 10–20)
CALCIUM SERPL-MCNC: 7.2 MG/DL (ref 8.5–10.1)
CHLORIDE SERPL-SCNC: 101 MMOL/L (ref 98–107)
CO2 SERPL-SCNC: 26 MMOL/L (ref 21–32)
COMPLETE: YES
CREAT CL PREDICTED SERPL C-G-VRATE: 70 ML/MIN
CREAT SERPL-MCNC: 0.8 MG/DL (ref 0.6–1.4)
EOSINOPHIL NFR BLD AUTO: 75 K/UL (ref 130–400)
GLUCOSE SERPL-MCNC: 97 MG/DL (ref 70–99)
HCT VFR BLD CALC: 31.9 % (ref 42–52)
HCT VFR BLD CALC: 33.6 % (ref 42–52)
IG%: 0.4 %
IMM GRANULOCYTES NFR BLD AUTO: 26 %
LYMPHOCYTES # BLD: 0.68 K/UL (ref 1.2–3.4)
MCH RBC QN AUTO: 29.7 PG (ref 25–34)
MCHC RBC AUTO-ENTMCNC: 34.8 G/DL (ref 32–36)
MCV RBC AUTO: 85.3 FL (ref 80–100)
MONOCYTES NFR BLD: 11.5 %
NEUTROPHILS # BLD AUTO: 1.9 %
NEUTROPHILS NFR BLD AUTO: 59.8 %
PMV BLD AUTO: 9.5 FL (ref 7.4–10.4)
POTASSIUM SERPL-SCNC: 3.4 MMOL/L (ref 3.5–5.1)
RBC # BLD AUTO: 3.74 M/UL (ref 4.7–6.1)
SODIUM SERPL-SCNC: 136 MMOL/L (ref 136–145)
WBC # BLD AUTO: 2.62 K/UL (ref 4.8–10.8)

## 2017-06-16 RX ADMIN — FINASTERIDE SCH MG: 5 TABLET, FILM COATED ORAL at 08:43

## 2017-06-16 RX ADMIN — PANTOPRAZOLE SCH MG: 40 TABLET, DELAYED RELEASE ORAL at 08:43

## 2017-06-16 RX ADMIN — ATORVASTATIN CALCIUM SCH MG: 40 TABLET, FILM COATED ORAL at 08:43

## 2017-06-16 RX ADMIN — SODIUM CHLORIDE SCH MLS/HR: 900 INJECTION, SOLUTION INTRAVENOUS at 12:53

## 2017-06-16 RX ADMIN — MONTELUKAST SODIUM SCH MG: 10 TABLET, FILM COATED ORAL at 08:43

## 2017-06-16 RX ADMIN — FLUTICASONE PROPIONATE AND SALMETEROL SCH PUFF: 50; 500 POWDER RESPIRATORY (INHALATION) at 08:43

## 2017-06-16 RX ADMIN — SODIUM CHLORIDE SCH MLS/HR: 900 INJECTION, SOLUTION INTRAVENOUS at 02:13

## 2017-06-16 RX ADMIN — TAMSULOSIN HYDROCHLORIDE SCH MG: 0.4 CAPSULE ORAL at 08:43

## 2017-06-16 NOTE — DISCHARGE SUMMARY
Discharge Summary


Date of Service


Jun 16, 2017.





Discharge Summary


Admission Date:


Jun 13, 2017 at 21:14


Discharge Date:  Jun 16, 2017


Discharge Disposition:  Home


Principal Diagnosis:  Rectal bleeding, mild dementia


Immunizations:  


   Have You Had Influenza Vaccine:  No


   Influenza Vaccine Date:  Sep 21, 2009


   History of Tetanus Vaccine?:  Yes


   History of Pneumococcal:  Yes


   History of Hepatitis B Vaccine:  No


Consultations:


GI





Medication Reconciliation


New Medications:  


Donepezil HCl (Aricept) 5 Mg Tab


1 TAB PO DAILY for 30 Days, #30 TAB 0 Refills





 


Continued Medications:  


Albuterol Hfa (Ventolin Hfa) 200 Puffs/47738 Mcg Aers


2-4 PUFFS INH Q6H PRN for SOB/Wheezing





Ascorbic Acid (Vitamin C) 500 Mg Tab


500 MG PO DAILY





Cholecalciferol (Vitamin D3) 1,000 Unit Tab


1000 UNITS PO DAILY





Finasteride (Proscar) 5 Mg Tab


5 MG PO DAILY





Fluticasone Prop/Salmeterol (Advair Diskus 500/50 60 Dose) 1 Ea Aerp


1 PUFF INH BID





Lansoprazole (Prevacid) 30 Mg Capcr


30 MG PO DAILY





Loratadine (Allergy Relief) 10 Mg Tab


10 MG PO HS





Montelukast Sodium (Singulair) 10 Mg Tab


10 MG PO DAILY





Multiple Vitamins W/ Minerals (Centrum Adults) 1 Tab Tab


1 TAB PO DAILY





Ocuvite Preservision (Ocuvite Preservision) 1 Tab Tab


2 TAB PO QAM





Ranitidine (Zantac) 300 Mg Tab


300 MG PO HS





Tamsulosin Hcl (Flomax) 0.4 Mg Cap


0.4 MG PO DAILY











Discharge Exam


Review of Systems:  


   Constitutional:  No fever, No chills, No sweats, No weight loss, No weakness


   Eyes:  No worsening of vision, No eye pain, No redness, No discharge


   ENT:  No hearing loss, No unusual epistaxis, No nasal symptoms, No sore 

throat, No tinnitus, No trouble swallowing


   Respiratory:  No cough, No sputum, No wheezing, No shortness of breath, No 

hemoptysis


   Cardiovascular:  No chest pain, No orthopnea, No PND, No edema, No 

claudication


   Abdomen:  No pain, No nausea, No vomiting, No diarrhea, No constipation, No 

GI bleeding


   Musculoskeletal:  No joint pain, No muscle pain, No swelling, No calf pain


   Genitourinary - Male:  No hematuria, No dysuria, No urinary frequency, No 

urinary urgency


   Neurologic:  + memory loss, No paralysis, No weakness, No numbness/tingling, 

No vertigo


   Psychiatric:  No depression symptoms, No anhedonism, No anxiety, No insomnia


   Endocrine:  No fatigue, No excessive thirst


   Integumentary:  No rash, No itch


Physical Exam:  


   General Appearance:  WD/WN, no apparent distress


   Eyes:  normal inspection, PERRL, EOMI, sclerae normal


   ENT:  normal ENT inspection, hearing grossly normal, TMs normal, pharynx 

normal


   Neck:  supple, no adenopathy, thyroid normal, no JVD


   Respiratory/Chest:  chest non-tender, lungs clear, normal breath sounds, no 

respiratory distress


   Cardiovascular:  regular rate, rhythm, no edema, no gallop, no JVD


   Abdomen / GI:  normal bowel sounds, non tender, soft, no organomegaly


   Extremities:  normal inspection, no calf tenderness, normal capillary refill

, no pedal edema


   Neurologic/Psychiatric:  alert, normal mood/affect, normal reflexes, 

oriented x 3





Hospital Course


84 yo male presents to the ER with complaints of rectal bleeding and increasing 

forgetfulness for past 10 days





Confusion likely related to mild dementia. Improving per wife reports. No 

source of infections and sign or symptoms of parkinsons dementia. 


CT head/MRI/MRA head and neck unremarkable. Gait intact and no weakness noted. 

Likely anemia from rectal 


bleeding exacerbating dementia, Hg stabilized over hospital course with Hg on 

admission 12.9 and on discharge 11.7.


Pt also reported no further bleeding. Neurology consulted and recs appreciated. 

For mild dementia will cont on 


aricept 5 mg PO daily on discharge. 





Acute blood loss anemia likely related to rectal bleeding, GI consulted, Hg 

dropfrom 12.9 to 11.7 in 3 days. Pt denies any further rectal bleeding.


Pt not able to complete bowel prep, will plan for colonoscopy as outpatient 

with Dr Chamorro. 





Thrombocytopenia chronic, baseline is around 150





H/o Asthma, continue advair, singulair and albuterol, stable at this time





BPH, cont flomax and proscar





DVT prophylaxis with SCDs only due to rectal bleeding and anemia





DNR code status


Total Time Spent:  Greater than 30 minutes


This includes examination of the patient, discharge planning, medication 

reconciliation, and communication with other providers.





Discharge Instructions


Please refer to the electronic Patient Visit Report (Discharge Instructions) 

for additional information.





Additional Copies To


Td Lee M.D.

## 2017-06-16 NOTE — DISCHARGE INSTRUCTIONS
Discharge Instructions


Date of Service


Jun 16, 2017.





Admission


Reason for Admission:  Tia (Transient Ischemic Attack)





Discharge


Discharge Diagnosis / Problem:  Rectal bleeding, mild dementia





Discharge Goals


Goal(s):  Decrease discomfort, Improve function, Increase independence, Improve 

disease control, Diagnostic testing, Therapeutic intervention, Prevent Disease 

Progression





Activity Recommendations


Activity Limitations:  resume your previous activity


Exercise/Sports Limitations:  none


Patient to be discharged home


Please resume all home medications


Prescription sent to pharmacy for aricept 5 mg tablet once a day for mild 

dementia


Colonoscopy will be set up as an outpatient with Dr Chamorro as hemoglobin is stable


If worsening rectal bleeding please report to ER


Please follow up with Dr Lee in 1-2 weeks





Current Hospital Diet


Patient's current hospital diet: Regular Diet





Discharge Diet


Recommended Diet:  Regular Diet





Pending Studies


Studies pending at discharge:  no





Laboratory Results





Hemoglobin A1c








Test


  6/13/17


17:50 Range/Units


 


 


Estimated Average Glucose 117   mg/dl


 


Hemoglobin A1c 5.7 H 4.5-5.6  %








Lipid Panel








Test


  6/14/17


02:50 Range/Units


 


 


Triglycerides Level 58  0-150  mg/dl


 


Cholesterol Level 113  0-200  mg/dl


 


HDL Cholesterol 46   mg/dl


 


Cholesterol/HDL Ratio 2.5   


 


LDL Cholesterol, Calculated 55   mg/dl











Medical Emergencies








.


Who to Call and When:





Medical Emergencies:  If at any time you feel your situation is an emergency, 

please call 911 immediately.





.





Non-Emergent Contact


Non-Emergency issues call your:  Primary Care Provider


Call Non-Emergent contact if:  you have a fever





.


.








"Provider Documentation" section prepared by Rod Thomas.








.





VTE Core Measure


Inpt VTE Proph given/why not?:  Contraindicated

## 2017-06-16 NOTE — PROGRESS NOTE
DATE: 06/16/2017

 

RACE:  .

 

HISTORY OF PRESENT ILLNESS:  I had the pleasure of seeing Donal Walton at his

bedside today.  He was scheduled to have a colonoscopy yesterday; however, he

did not complete a bowel prep.  Today his nursing team called and states that

he still could not complete his bowel prep.  He was also not having any overt

GI bleeding and his H&H had actually risen from his prior evaluation and

decision was made to cancel the colonoscopy.  The patient was in agreement

with this.  He states that he would follow up with his primary care doctor,

Dr. Lee as an outpatient and follow up with our office as needed as needed. 

It should be noted that he did have a colonoscopy as recently as 09/29/2014

which showed a small cecal polyp which was removed with cold forceps,

left-sided diverticulosis and external hemorrhoids.  The pathology from the

polyp showed a benign colonic mucosa without pathologic change and therefore

due to the patient's age, a screening colonoscopy would not be recommended. 

However, if the patient continues to have symptomatic anemia or GI blood

loss, that would be considered a diagnostic colonoscopy and this can be

performed as an outpatient.

 

Once again, thanks for allowing me to participate in the care of this

patient.  If you have any further questions, please do not hesitate in

contacting me.

## 2017-06-20 ENCOUNTER — HOSPITAL ENCOUNTER (OUTPATIENT)
Dept: HOSPITAL 45 - C.LAB1850 | Age: 82
Discharge: HOME | End: 2017-06-20
Attending: NURSE PRACTITIONER
Payer: COMMERCIAL

## 2017-06-20 DIAGNOSIS — D62: Primary | ICD-10-CM

## 2017-06-20 LAB — HCT VFR BLD CALC: 31.2 % (ref 42–52)

## 2017-11-17 ENCOUNTER — HOSPITAL ENCOUNTER (OUTPATIENT)
Dept: HOSPITAL 45 - C.LAB1850 | Age: 82
Discharge: HOME | End: 2017-11-17
Attending: INTERNAL MEDICINE
Payer: COMMERCIAL

## 2017-11-17 DIAGNOSIS — R73.03: ICD-10-CM

## 2017-11-17 DIAGNOSIS — E55.9: Primary | ICD-10-CM

## 2017-11-17 DIAGNOSIS — R41.89: ICD-10-CM

## 2017-11-17 LAB
ANION GAP SERPL CALC-SCNC: 5 MMOL/L (ref 3–11)
BUN SERPL-MCNC: 18 MG/DL (ref 7–18)
BUN/CREAT SERPL: 20 (ref 10–20)
CALCIUM SERPL-MCNC: 8.7 MG/DL (ref 8.5–10.1)
CHLORIDE SERPL-SCNC: 104 MMOL/L (ref 98–107)
CO2 SERPL-SCNC: 30 MMOL/L (ref 21–32)
CREAT SERPL-MCNC: 0.9 MG/DL (ref 0.6–1.4)
EOSINOPHIL NFR BLD AUTO: 141 K/UL (ref 130–400)
EST. AVERAGE GLUCOSE BLD GHB EST-MCNC: 123 MG/DL
GLUCOSE SERPL-MCNC: 111 MG/DL (ref 70–99)
HCT VFR BLD CALC: 41.7 % (ref 42–52)
MCH RBC QN AUTO: 30.2 PG (ref 25–34)
MCHC RBC AUTO-ENTMCNC: 34.3 G/DL (ref 32–36)
MCV RBC AUTO: 88.2 FL (ref 80–100)
PMV BLD AUTO: 9.8 FL (ref 7.4–10.4)
POTASSIUM SERPL-SCNC: 4 MMOL/L (ref 3.5–5.1)
RBC # BLD AUTO: 4.73 M/UL (ref 4.7–6.1)
SODIUM SERPL-SCNC: 139 MMOL/L (ref 136–145)
TSH SERPL-ACNC: 2.26 UIU/ML (ref 0.3–4.5)
WBC # BLD AUTO: 4.95 K/UL (ref 4.8–10.8)

## 2018-05-08 ENCOUNTER — HOSPITAL ENCOUNTER (OUTPATIENT)
Dept: HOSPITAL 45 - C.LAB1850 | Age: 83
Discharge: HOME | End: 2018-05-08
Attending: INTERNAL MEDICINE
Payer: COMMERCIAL

## 2018-05-08 DIAGNOSIS — R41.89: Primary | ICD-10-CM

## 2018-05-08 LAB
ALBUMIN SERPL-MCNC: 3.5 GM/DL (ref 3.4–5)
ALP SERPL-CCNC: 43 U/L (ref 45–117)
ALT SERPL-CCNC: 27 U/L (ref 12–78)
AST SERPL-CCNC: 23 U/L (ref 15–37)
BUN SERPL-MCNC: 18 MG/DL (ref 7–18)
CALCIUM SERPL-MCNC: 8.1 MG/DL (ref 8.5–10.1)
CO2 SERPL-SCNC: 31 MMOL/L (ref 21–32)
CREAT SERPL-MCNC: 0.97 MG/DL (ref 0.6–1.4)
EOSINOPHIL NFR BLD AUTO: 146 K/UL (ref 130–400)
GLUCOSE SERPL-MCNC: 106 MG/DL (ref 70–99)
HCT VFR BLD CALC: 42.5 % (ref 42–52)
HGB BLD-MCNC: 14.9 G/DL (ref 14–18)
KETONES UR QL STRIP: 77 MG/DL
MCH RBC QN AUTO: 30.9 PG (ref 25–34)
MCHC RBC AUTO-ENTMCNC: 35.1 G/DL (ref 32–36)
MCV RBC AUTO: 88.2 FL (ref 80–100)
PH UR: 143 MG/DL (ref 0–200)
PMV BLD AUTO: 10.1 FL (ref 7.4–10.4)
POTASSIUM SERPL-SCNC: 3.9 MMOL/L (ref 3.5–5.1)
PROT SERPL-MCNC: 6.9 GM/DL (ref 6.4–8.2)
RED CELL DISTRIBUTION WIDTH CV: 13.9 % (ref 11.5–14.5)
RED CELL DISTRIBUTION WIDTH SD: 45.2 FL (ref 36.4–46.3)
SODIUM SERPL-SCNC: 141 MMOL/L (ref 136–145)
WBC # BLD AUTO: 5.33 K/UL (ref 4.8–10.8)